# Patient Record
Sex: MALE | Race: WHITE | NOT HISPANIC OR LATINO | URBAN - METROPOLITAN AREA
[De-identification: names, ages, dates, MRNs, and addresses within clinical notes are randomized per-mention and may not be internally consistent; named-entity substitution may affect disease eponyms.]

---

## 2018-11-27 ENCOUNTER — APPOINTMENT (RX ONLY)
Dept: URBAN - METROPOLITAN AREA CLINIC 349 | Facility: CLINIC | Age: 38
Setting detail: DERMATOLOGY
End: 2018-11-27

## 2018-11-27 DIAGNOSIS — L30.9 DERMATITIS, UNSPECIFIED: ICD-10-CM

## 2018-11-27 DIAGNOSIS — L50.1 IDIOPATHIC URTICARIA: ICD-10-CM

## 2018-11-27 PROBLEM — E78.5 HYPERLIPIDEMIA, UNSPECIFIED: Status: ACTIVE | Noted: 2018-11-27

## 2018-11-27 PROBLEM — F41.9 ANXIETY DISORDER, UNSPECIFIED: Status: ACTIVE | Noted: 2018-11-27

## 2018-11-27 PROCEDURE — ? COUNSELING

## 2018-11-27 PROCEDURE — A4550 SURGICAL TRAYS: HCPCS

## 2018-11-27 PROCEDURE — ? BIOPSY BY PUNCH METHOD

## 2018-11-27 PROCEDURE — 99202 OFFICE O/P NEW SF 15 MIN: CPT | Mod: 25

## 2018-11-27 PROCEDURE — 11100: CPT

## 2018-11-27 PROCEDURE — ? PRESCRIPTION

## 2018-11-27 PROCEDURE — ? PATHOLOGY BILLING

## 2018-11-27 PROCEDURE — ? TREATMENT REGIMEN

## 2018-11-27 RX ORDER — TRIAMCINOLONE ACETONIDE 1 MG/G
CREAM TOPICAL
Qty: 1 | Refills: 1 | Status: ERX | COMMUNITY
Start: 2018-11-27

## 2018-11-27 RX ADMIN — TRIAMCINOLONE ACETONIDE: 1 CREAM TOPICAL at 00:00

## 2018-11-27 ASSESSMENT — LOCATION ZONE DERM
LOCATION ZONE: LEG
LOCATION ZONE: ARM
LOCATION ZONE: ARM
LOCATION ZONE: TRUNK
LOCATION ZONE: ARM

## 2018-11-27 ASSESSMENT — LOCATION SIMPLE DESCRIPTION DERM
LOCATION SIMPLE: RIGHT THIGH
LOCATION SIMPLE: LEFT FOREARM
LOCATION SIMPLE: ABDOMEN
LOCATION SIMPLE: LEFT UPPER BACK
LOCATION SIMPLE: LEFT ELBOW
LOCATION SIMPLE: LEFT ELBOW

## 2018-11-27 ASSESSMENT — LOCATION DETAILED DESCRIPTION DERM
LOCATION DETAILED: XIPHOID
LOCATION DETAILED: LEFT SUPERIOR MEDIAL UPPER BACK
LOCATION DETAILED: LEFT ELBOW
LOCATION DETAILED: LEFT ELBOW
LOCATION DETAILED: LEFT PROXIMAL DORSAL FOREARM
LOCATION DETAILED: RIGHT ANTERIOR PROXIMAL THIGH

## 2018-11-27 NOTE — PROCEDURE: TREATMENT REGIMEN
Otc Regimen: All free and Cerave moistuizer daily as discussed
Initiate Treatment: Triamcinolone
Detail Level: Detailed

## 2018-11-27 NOTE — PROCEDURE: PATHOLOGY BILLING
Immunohistochemistry (02082 and 73930) billing is not performed here. Please use the Immunohistochemistry Stain Billing plan to accomplish this. Immunohistochemistry (74504 and 07783) billing is not performed here. Please use the Immunohistochemistry Stain Billing plan to accomplish this.

## 2018-11-27 NOTE — PROCEDURE: BIOPSY BY PUNCH METHOD
Wound Care: Vaseline
Billing Type: Third-Party Bill
Biopsy Type: H and E
Size Of Lesion In Cm (Optional): 0
Bill For Surgical Tray: yes
Post-Care Instructions: I reviewed with the patient in detail post-care instructions. Patient is to keep the biopsy site dry overnight, and then apply bacitracin twice daily until healed. Patient may apply hydrogen peroxide soaks to remove any crusting.\\nAft the procedure, the patient was observed for 5-10 minutes and was oriented to person, place, and time.  Denied feeling dizzy, queasy, and stated that they did not feel that they were going to faint.
Anesthesia Volume In Cc (Will Not Render If 0): 0.5
Suture Removal: 10 days
Epidermal Sutures: 4-0 Polypropylene
Anesthesia Type: 1% lidocaine with epinephrine
Detail Level: Detailed
Punch Size In Mm: 4
Consent: Written consent was obtained and risks were reviewed including but not limited to scarring, infection, bleeding, scabbing, incomplete removal, nerve damage and allergy to anesthesia.
Bill 20754 For Specimen Handling/Conveyance To Laboratory?: no
Hemostasis: None
Accession #: global
Dressing: steri-strips
Notification Instructions: Patient will be notified of biopsy results. However, patient instructed to call the office if not contacted within 2 weeks.

## 2018-12-07 ENCOUNTER — APPOINTMENT (RX ONLY)
Dept: URBAN - METROPOLITAN AREA CLINIC 349 | Facility: CLINIC | Age: 38
Setting detail: DERMATOLOGY
End: 2018-12-07

## 2018-12-07 DIAGNOSIS — L40.0 PSORIASIS VULGARIS: ICD-10-CM

## 2018-12-07 DIAGNOSIS — Z79.899 OTHER LONG TERM (CURRENT) DRUG THERAPY: ICD-10-CM

## 2018-12-07 PROCEDURE — ? SUTURE REMOVAL (GLOBAL PERIOD)

## 2018-12-07 PROCEDURE — ? RECOMMENDATIONS

## 2018-12-07 PROCEDURE — 99213 OFFICE O/P EST LOW 20 MIN: CPT

## 2018-12-07 PROCEDURE — 99024 POSTOP FOLLOW-UP VISIT: CPT

## 2018-12-07 PROCEDURE — ? HIGH RISK MEDICATION MONITORING

## 2018-12-07 PROCEDURE — ? COUNSELING

## 2018-12-07 PROCEDURE — ? ORDER TESTS

## 2018-12-07 ASSESSMENT — LOCATION SIMPLE DESCRIPTION DERM
LOCATION SIMPLE: LEFT ELBOW
LOCATION SIMPLE: RIGHT THIGH

## 2018-12-07 ASSESSMENT — LOCATION ZONE DERM
LOCATION ZONE: LEG
LOCATION ZONE: ARM

## 2018-12-07 ASSESSMENT — LOCATION DETAILED DESCRIPTION DERM
LOCATION DETAILED: LEFT ELBOW
LOCATION DETAILED: RIGHT ANTERIOR PROXIMAL THIGH

## 2018-12-07 NOTE — PROCEDURE: SUTURE REMOVAL (GLOBAL PERIOD)
Detail Level: Detailed
Add 22541 Cpt? (Important Note: In 2017 The Use Of 08587 Is Being Tracked By Cms To Determine Future Global Period Reimbursement For Global Periods): yes

## 2018-12-07 NOTE — PROCEDURE: RECOMMENDATIONS
Recommendations (Free Text): Continue triamcinolone cream twice daily to affected area as needed \\nCerave moisturizer twice a day\\nPatient does have joint pain
Detail Level: Zone

## 2019-05-06 ENCOUNTER — APPOINTMENT (RX ONLY)
Dept: URBAN - METROPOLITAN AREA CLINIC 349 | Facility: CLINIC | Age: 39
Setting detail: DERMATOLOGY
End: 2019-05-06

## 2019-05-06 DIAGNOSIS — L40.0 PSORIASIS VULGARIS: ICD-10-CM | Status: STABLE

## 2019-05-06 DIAGNOSIS — L30.9 DERMATITIS, UNSPECIFIED: ICD-10-CM

## 2019-05-06 PROCEDURE — ? COUNSELING

## 2019-05-06 PROCEDURE — ? PRESCRIPTION

## 2019-05-06 PROCEDURE — ? TREATMENT REGIMEN

## 2019-05-06 PROCEDURE — 99213 OFFICE O/P EST LOW 20 MIN: CPT

## 2019-05-06 RX ORDER — CLINDAMYCIN PHOSPHATE 10 MG/G
GEL TOPICAL
Qty: 1 | Refills: 0 | Status: ERX | COMMUNITY
Start: 2019-05-06

## 2019-05-06 RX ADMIN — CLINDAMYCIN PHOSPHATE: 10 GEL TOPICAL at 00:00

## 2019-05-06 ASSESSMENT — LOCATION DETAILED DESCRIPTION DERM
LOCATION DETAILED: LEFT PROXIMAL POSTERIOR UPPER ARM
LOCATION DETAILED: RIGHT DISTAL POSTERIOR THIGH
LOCATION DETAILED: RIGHT LATERAL UPPER BACK
LOCATION DETAILED: LEFT ANTERIOR PROXIMAL THIGH
LOCATION DETAILED: LEFT ANTERIOR DISTAL THIGH
LOCATION DETAILED: RIGHT DISTAL POSTERIOR THIGH

## 2019-05-06 ASSESSMENT — LOCATION SIMPLE DESCRIPTION DERM
LOCATION SIMPLE: LEFT THIGH
LOCATION SIMPLE: RIGHT UPPER BACK
LOCATION SIMPLE: RIGHT POSTERIOR THIGH
LOCATION SIMPLE: LEFT UPPER ARM
LOCATION SIMPLE: RIGHT POSTERIOR THIGH
LOCATION SIMPLE: LEFT THIGH

## 2019-05-06 ASSESSMENT — LOCATION ZONE DERM
LOCATION ZONE: LEG
LOCATION ZONE: ARM
LOCATION ZONE: LEG
LOCATION ZONE: TRUNK

## 2019-05-06 NOTE — PROCEDURE: TREATMENT REGIMEN
Otc Regimen: Panoxyl wash
Detail Level: Detailed
Initiate Treatment: Panoxyl wash 10%\\nClindamycin gel

## 2019-05-20 RX ORDER — CLINDAMYCIN PHOSPHATE 10 MG/G
GEL TOPICAL
Qty: 1 | Refills: 0 | Status: ERX

## 2019-05-28 ENCOUNTER — APPOINTMENT (RX ONLY)
Dept: URBAN - METROPOLITAN AREA CLINIC 349 | Facility: CLINIC | Age: 39
Setting detail: DERMATOLOGY
End: 2019-05-28

## 2019-05-28 DIAGNOSIS — L30.9 DERMATITIS, UNSPECIFIED: ICD-10-CM | Status: IMPROVED

## 2019-05-28 DIAGNOSIS — L40.0 PSORIASIS VULGARIS: ICD-10-CM | Status: STABLE

## 2019-05-28 PROCEDURE — ? PRESCRIPTION

## 2019-05-28 PROCEDURE — ? RECOMMENDATIONS

## 2019-05-28 PROCEDURE — 99213 OFFICE O/P EST LOW 20 MIN: CPT

## 2019-05-28 PROCEDURE — ? TREATMENT REGIMEN

## 2019-05-28 PROCEDURE — ? COUNSELING

## 2019-05-28 RX ORDER — CLINDAMYCIN PHOSPHATE 10 MG/G
GEL TOPICAL
Qty: 1 | Refills: 4 | Status: ERX

## 2019-05-28 ASSESSMENT — LOCATION DETAILED DESCRIPTION DERM
LOCATION DETAILED: RIGHT DISTAL POSTERIOR THIGH
LOCATION DETAILED: RIGHT LATERAL UPPER BACK
LOCATION DETAILED: LEFT ANTERIOR PROXIMAL THIGH
LOCATION DETAILED: LEFT ANTERIOR DISTAL THIGH
LOCATION DETAILED: RIGHT DISTAL POSTERIOR THIGH
LOCATION DETAILED: LEFT PROXIMAL POSTERIOR UPPER ARM

## 2019-05-28 ASSESSMENT — LOCATION ZONE DERM
LOCATION ZONE: ARM
LOCATION ZONE: LEG
LOCATION ZONE: TRUNK
LOCATION ZONE: LEG

## 2019-05-28 ASSESSMENT — LOCATION SIMPLE DESCRIPTION DERM
LOCATION SIMPLE: LEFT THIGH
LOCATION SIMPLE: LEFT UPPER ARM
LOCATION SIMPLE: RIGHT POSTERIOR THIGH
LOCATION SIMPLE: LEFT THIGH
LOCATION SIMPLE: RIGHT UPPER BACK
LOCATION SIMPLE: RIGHT POSTERIOR THIGH

## 2019-05-28 NOTE — PROCEDURE: TREATMENT REGIMEN
Continue Regimen: Panoxyl wash 10%\\nClindamycin gel
Otc Regimen: Panoxyl wash
Detail Level: Detailed

## 2019-05-28 NOTE — PROCEDURE: RECOMMENDATIONS
Detail Level: Zone
Recommendations (Free Text): Patient given [facility] # to call and follow up, messages were left and unable to reach patient

## 2019-09-10 ENCOUNTER — APPOINTMENT (RX ONLY)
Dept: URBAN - METROPOLITAN AREA CLINIC 349 | Facility: CLINIC | Age: 39
Setting detail: DERMATOLOGY
End: 2019-09-10

## 2019-09-10 DIAGNOSIS — L40.0 PSORIASIS VULGARIS: ICD-10-CM

## 2019-09-10 PROCEDURE — ? INJECTION

## 2019-09-10 PROCEDURE — 96372 THER/PROPH/DIAG INJ SC/IM: CPT

## 2019-09-10 ASSESSMENT — LOCATION DETAILED DESCRIPTION DERM: LOCATION DETAILED: LEFT ANTERIOR PROXIMAL THIGH

## 2019-09-10 ASSESSMENT — LOCATION SIMPLE DESCRIPTION DERM: LOCATION SIMPLE: LEFT THIGH

## 2019-09-10 ASSESSMENT — LOCATION ZONE DERM: LOCATION ZONE: LEG

## 2021-01-05 ENCOUNTER — ANESTHESIA EVENT (OUTPATIENT)
Dept: SURGERY | Age: 41
End: 2021-01-05
Payer: COMMERCIAL

## 2021-01-06 ENCOUNTER — ANESTHESIA (OUTPATIENT)
Dept: SURGERY | Age: 41
End: 2021-01-06
Payer: COMMERCIAL

## 2021-01-06 ENCOUNTER — HOSPITAL ENCOUNTER (OUTPATIENT)
Age: 41
Setting detail: OUTPATIENT SURGERY
Discharge: HOME | End: 2021-01-06
Attending: ORTHOPAEDIC SURGERY | Admitting: ORTHOPAEDIC SURGERY
Payer: COMMERCIAL

## 2021-01-06 VITALS
OXYGEN SATURATION: 95 % | RESPIRATION RATE: 14 BRPM | SYSTOLIC BLOOD PRESSURE: 133 MMHG | HEART RATE: 71 BPM | BODY MASS INDEX: 30.67 KG/M2 | WEIGHT: 190 LBS | DIASTOLIC BLOOD PRESSURE: 83 MMHG | TEMPERATURE: 97.3 F

## 2021-01-06 PROCEDURE — 77030002934 HC SUT MCRYL J&J -B: Performed by: ORTHOPAEDIC SURGERY

## 2021-01-06 PROCEDURE — 76010010054 HC POST OP PAIN BLOCK: Performed by: ORTHOPAEDIC SURGERY

## 2021-01-06 PROCEDURE — 77030003666 HC NDL SPINAL BD -A: Performed by: ORTHOPAEDIC SURGERY

## 2021-01-06 PROCEDURE — 77030010509 HC AIRWY LMA MSK TELE -A: Performed by: ANESTHESIOLOGY

## 2021-01-06 PROCEDURE — 74011250636 HC RX REV CODE- 250/636: Performed by: ANESTHESIOLOGY

## 2021-01-06 PROCEDURE — 74011250636 HC RX REV CODE- 250/636: Performed by: NURSE ANESTHETIST, CERTIFIED REGISTERED

## 2021-01-06 PROCEDURE — C1713 ANCHOR/SCREW BN/BN,TIS/BN: HCPCS | Performed by: ORTHOPAEDIC SURGERY

## 2021-01-06 PROCEDURE — 77030006891 HC BLD SHV RESECT STRY -B: Performed by: ORTHOPAEDIC SURGERY

## 2021-01-06 PROCEDURE — 74011000250 HC RX REV CODE- 250: Performed by: NURSE ANESTHETIST, CERTIFIED REGISTERED

## 2021-01-06 PROCEDURE — 76210000063 HC OR PH I REC FIRST 0.5 HR: Performed by: ORTHOPAEDIC SURGERY

## 2021-01-06 PROCEDURE — 77030033005 HC TBNG ARTHSC PMP STRY -B: Performed by: ORTHOPAEDIC SURGERY

## 2021-01-06 PROCEDURE — 77030006788 HC BLD SAW OSC STRY -B: Performed by: ORTHOPAEDIC SURGERY

## 2021-01-06 PROCEDURE — 77030002966 HC SUT PDS J&J -A: Performed by: ORTHOPAEDIC SURGERY

## 2021-01-06 PROCEDURE — 76210000020 HC REC RM PH II FIRST 0.5 HR: Performed by: ORTHOPAEDIC SURGERY

## 2021-01-06 PROCEDURE — 74011250637 HC RX REV CODE- 250/637: Performed by: ANESTHESIOLOGY

## 2021-01-06 PROCEDURE — 77030002933 HC SUT MCRYL J&J -A: Performed by: ORTHOPAEDIC SURGERY

## 2021-01-06 PROCEDURE — 77030000032 HC CUF TRNQT ZIMM -B: Performed by: ORTHOPAEDIC SURGERY

## 2021-01-06 PROCEDURE — 76010010054 HC POST OP PAIN BLOCK

## 2021-01-06 PROCEDURE — 77030006590 HC BLD ARTHSC GRFT J&J -C: Performed by: ORTHOPAEDIC SURGERY

## 2021-01-06 PROCEDURE — 77030018986 HC SUT ETHBND4 J&J -B: Performed by: ORTHOPAEDIC SURGERY

## 2021-01-06 PROCEDURE — 77030007604: Performed by: ORTHOPAEDIC SURGERY

## 2021-01-06 PROCEDURE — 77030020275 HC MISC ORTHOPEDIC: Performed by: ORTHOPAEDIC SURGERY

## 2021-01-06 PROCEDURE — 76942 ECHO GUIDE FOR BIOPSY: CPT | Performed by: ORTHOPAEDIC SURGERY

## 2021-01-06 PROCEDURE — 76060000035 HC ANESTHESIA 2 TO 2.5 HR: Performed by: ORTHOPAEDIC SURGERY

## 2021-01-06 PROCEDURE — 76010000171 HC OR TIME 2 TO 2.5 HR INTENSV-TIER 1: Performed by: ORTHOPAEDIC SURGERY

## 2021-01-06 PROCEDURE — 77030040936 HC WND COBLATN S&N -C: Performed by: ORTHOPAEDIC SURGERY

## 2021-01-06 PROCEDURE — 2709999900 HC NON-CHARGEABLE SUPPLY: Performed by: ORTHOPAEDIC SURGERY

## 2021-01-06 PROCEDURE — 77030003602 HC NDL NRV BLK BBMI -B: Performed by: ANESTHESIOLOGY

## 2021-01-06 DEVICE — BIOSURE REGENSORB INTERFERENCE                                    SCREW 7 MM X 25MM
Type: IMPLANTABLE DEVICE | Site: KNEE | Status: FUNCTIONAL
Brand: BIOSURE

## 2021-01-06 RX ORDER — MIDAZOLAM HYDROCHLORIDE 1 MG/ML
2 INJECTION, SOLUTION INTRAMUSCULAR; INTRAVENOUS ONCE
Status: COMPLETED | OUTPATIENT
Start: 2021-01-06 | End: 2021-01-06

## 2021-01-06 RX ORDER — HYDROMORPHONE HYDROCHLORIDE 2 MG/ML
0.5 INJECTION, SOLUTION INTRAMUSCULAR; INTRAVENOUS; SUBCUTANEOUS
Status: DISCONTINUED | OUTPATIENT
Start: 2021-01-06 | End: 2021-01-06 | Stop reason: HOSPADM

## 2021-01-06 RX ORDER — SODIUM CHLORIDE 0.9 % (FLUSH) 0.9 %
5-40 SYRINGE (ML) INJECTION EVERY 8 HOURS
Status: DISCONTINUED | OUTPATIENT
Start: 2021-01-06 | End: 2021-01-06 | Stop reason: HOSPADM

## 2021-01-06 RX ORDER — MIDAZOLAM HYDROCHLORIDE 1 MG/ML
2 INJECTION, SOLUTION INTRAMUSCULAR; INTRAVENOUS
Status: DISCONTINUED | OUTPATIENT
Start: 2021-01-06 | End: 2021-01-06 | Stop reason: HOSPADM

## 2021-01-06 RX ORDER — SODIUM CHLORIDE, SODIUM LACTATE, POTASSIUM CHLORIDE, CALCIUM CHLORIDE 600; 310; 30; 20 MG/100ML; MG/100ML; MG/100ML; MG/100ML
100 INJECTION, SOLUTION INTRAVENOUS CONTINUOUS
Status: DISCONTINUED | OUTPATIENT
Start: 2021-01-06 | End: 2021-01-06 | Stop reason: HOSPADM

## 2021-01-06 RX ORDER — DEXAMETHASONE SODIUM PHOSPHATE 4 MG/ML
INJECTION, SOLUTION INTRA-ARTICULAR; INTRALESIONAL; INTRAMUSCULAR; INTRAVENOUS; SOFT TISSUE AS NEEDED
Status: DISCONTINUED | OUTPATIENT
Start: 2021-01-06 | End: 2021-01-06 | Stop reason: HOSPADM

## 2021-01-06 RX ORDER — ROPIVACAINE HYDROCHLORIDE 5 MG/ML
INJECTION, SOLUTION EPIDURAL; INFILTRATION; PERINEURAL AS NEEDED
Status: DISCONTINUED | OUTPATIENT
Start: 2021-01-06 | End: 2021-01-06 | Stop reason: HOSPADM

## 2021-01-06 RX ORDER — CEFAZOLIN SODIUM/WATER 2 G/20 ML
2 SYRINGE (ML) INTRAVENOUS ONCE
Status: DISCONTINUED | OUTPATIENT
Start: 2021-01-06 | End: 2021-01-06 | Stop reason: HOSPADM

## 2021-01-06 RX ORDER — ONDANSETRON 2 MG/ML
INJECTION INTRAMUSCULAR; INTRAVENOUS AS NEEDED
Status: DISCONTINUED | OUTPATIENT
Start: 2021-01-06 | End: 2021-01-06 | Stop reason: HOSPADM

## 2021-01-06 RX ORDER — FENTANYL CITRATE 50 UG/ML
INJECTION, SOLUTION INTRAMUSCULAR; INTRAVENOUS AS NEEDED
Status: DISCONTINUED | OUTPATIENT
Start: 2021-01-06 | End: 2021-01-06 | Stop reason: HOSPADM

## 2021-01-06 RX ORDER — OXYCODONE HYDROCHLORIDE 5 MG/1
10 TABLET ORAL
Status: COMPLETED | OUTPATIENT
Start: 2021-01-06 | End: 2021-01-06

## 2021-01-06 RX ORDER — LIDOCAINE HYDROCHLORIDE 20 MG/ML
INJECTION, SOLUTION EPIDURAL; INFILTRATION; INTRACAUDAL; PERINEURAL AS NEEDED
Status: DISCONTINUED | OUTPATIENT
Start: 2021-01-06 | End: 2021-01-06 | Stop reason: HOSPADM

## 2021-01-06 RX ORDER — SODIUM CHLORIDE 0.9 % (FLUSH) 0.9 %
5-40 SYRINGE (ML) INJECTION AS NEEDED
Status: DISCONTINUED | OUTPATIENT
Start: 2021-01-06 | End: 2021-01-06 | Stop reason: HOSPADM

## 2021-01-06 RX ORDER — FLUMAZENIL 0.1 MG/ML
0.2 INJECTION INTRAVENOUS
Status: DISCONTINUED | OUTPATIENT
Start: 2021-01-06 | End: 2021-01-06 | Stop reason: HOSPADM

## 2021-01-06 RX ORDER — KETOROLAC TROMETHAMINE 30 MG/ML
INJECTION, SOLUTION INTRAMUSCULAR; INTRAVENOUS AS NEEDED
Status: DISCONTINUED | OUTPATIENT
Start: 2021-01-06 | End: 2021-01-06 | Stop reason: HOSPADM

## 2021-01-06 RX ORDER — LIDOCAINE HYDROCHLORIDE 10 MG/ML
0.1 INJECTION INFILTRATION; PERINEURAL AS NEEDED
Status: DISCONTINUED | OUTPATIENT
Start: 2021-01-06 | End: 2021-01-06 | Stop reason: HOSPADM

## 2021-01-06 RX ORDER — ACETAMINOPHEN 500 MG
1000 TABLET ORAL ONCE
Status: COMPLETED | OUTPATIENT
Start: 2021-01-06 | End: 2021-01-06

## 2021-01-06 RX ORDER — OXYCODONE HYDROCHLORIDE 5 MG/1
5 TABLET ORAL
Status: DISCONTINUED | OUTPATIENT
Start: 2021-01-06 | End: 2021-01-06 | Stop reason: HOSPADM

## 2021-01-06 RX ORDER — NALOXONE HYDROCHLORIDE 0.4 MG/ML
0.2 INJECTION, SOLUTION INTRAMUSCULAR; INTRAVENOUS; SUBCUTANEOUS AS NEEDED
Status: DISCONTINUED | OUTPATIENT
Start: 2021-01-06 | End: 2021-01-06 | Stop reason: HOSPADM

## 2021-01-06 RX ORDER — FENTANYL CITRATE 50 UG/ML
100 INJECTION, SOLUTION INTRAMUSCULAR; INTRAVENOUS ONCE
Status: COMPLETED | OUTPATIENT
Start: 2021-01-06 | End: 2021-01-06

## 2021-01-06 RX ORDER — PROPOFOL 10 MG/ML
INJECTION, EMULSION INTRAVENOUS AS NEEDED
Status: DISCONTINUED | OUTPATIENT
Start: 2021-01-06 | End: 2021-01-06 | Stop reason: HOSPADM

## 2021-01-06 RX ORDER — DIPHENHYDRAMINE HYDROCHLORIDE 50 MG/ML
12.5 INJECTION, SOLUTION INTRAMUSCULAR; INTRAVENOUS
Status: DISCONTINUED | OUTPATIENT
Start: 2021-01-06 | End: 2021-01-06 | Stop reason: HOSPADM

## 2021-01-06 RX ADMIN — DEXAMETHASONE SODIUM PHOSPHATE 4 MG: 4 INJECTION, SOLUTION INTRAMUSCULAR; INTRAVENOUS at 07:03

## 2021-01-06 RX ADMIN — FENTANYL CITRATE 50 MCG: 50 INJECTION INTRAMUSCULAR; INTRAVENOUS at 08:40

## 2021-01-06 RX ADMIN — ONDANSETRON 4 MG: 2 INJECTION INTRAMUSCULAR; INTRAVENOUS at 08:08

## 2021-01-06 RX ADMIN — PROPOFOL 200 MG: 10 INJECTION, EMULSION INTRAVENOUS at 07:58

## 2021-01-06 RX ADMIN — ACETAMINOPHEN 1000 MG: 500 TABLET ORAL at 10:24

## 2021-01-06 RX ADMIN — DEXAMETHASONE SODIUM PHOSPHATE 2 MG: 4 INJECTION, SOLUTION INTRAMUSCULAR; INTRAVENOUS at 07:06

## 2021-01-06 RX ADMIN — KETOROLAC TROMETHAMINE 30 MG: 30 INJECTION, SOLUTION INTRAMUSCULAR at 09:52

## 2021-01-06 RX ADMIN — DEXAMETHASONE SODIUM PHOSPHATE 4 MG: 4 INJECTION, SOLUTION INTRAMUSCULAR; INTRAVENOUS at 08:08

## 2021-01-06 RX ADMIN — LIDOCAINE HYDROCHLORIDE 80 MG: 20 INJECTION, SOLUTION EPIDURAL; INFILTRATION; INTRACAUDAL; PERINEURAL at 07:58

## 2021-01-06 RX ADMIN — OXYCODONE HYDROCHLORIDE 10 MG: 5 TABLET ORAL at 10:24

## 2021-01-06 RX ADMIN — SODIUM CHLORIDE, SODIUM LACTATE, POTASSIUM CHLORIDE, AND CALCIUM CHLORIDE 100 ML/HR: 600; 310; 30; 20 INJECTION, SOLUTION INTRAVENOUS at 06:45

## 2021-01-06 RX ADMIN — FENTANYL CITRATE 50 MCG: 50 INJECTION INTRAMUSCULAR; INTRAVENOUS at 08:12

## 2021-01-06 RX ADMIN — ROPIVACAINE HYDROCHLORIDE 20 ML: 150 INJECTION, SOLUTION EPIDURAL; INFILTRATION; PERINEURAL at 07:06

## 2021-01-06 RX ADMIN — MIDAZOLAM 2 MG: 1 INJECTION INTRAMUSCULAR; INTRAVENOUS at 06:58

## 2021-01-06 RX ADMIN — FENTANYL CITRATE 100 MCG: 0.05 INJECTION, SOLUTION INTRAMUSCULAR; INTRAVENOUS at 06:58

## 2021-01-06 RX ADMIN — ROPIVACAINE HYDROCHLORIDE 30 ML: 150 INJECTION, SOLUTION EPIDURAL; INFILTRATION; PERINEURAL at 07:03

## 2021-01-06 NOTE — DISCHARGE INSTRUCTIONS
Post-Operative Instructions   For  Anterior Cruciate Ligament Reconstruction  Phone:  [phone]    1. Touch down weight bearing for 6 weeks on the operated leg. Use crutches to help with ambulation. 2.  If you do not have an \"Iceman\" type cooling unit, for the first 48-72 hours following surgery, use ice on the knee every two hours (while awake) for 20-30 minutes at a time to help prevent swelling and lessen pain. If you have a cooling unit, follow the instructions given to you- continually as much as possible the first 48-72 hours, then 3-4 times a day for 4 weeks. Elevate leg. 3. You have been given a hinged brace. Keep the brace locked in a straight position at all times until you begin therapy. 4. You may remove the brace to shower and wrap the dressings to keep them dry. 5. Use any pain medication as instructed. You should take your pain medication as soon as you feel the anesthetic wearing off. Do not wait until you are in severe pain to begin taking your pain medication. 6. You may have some side effects from your pain medication. If you have nausea, try taking your medication with food. For itching, you may take over the counter Benadryl. 7. Begin therapy as ordered    8. You may have been given a prescription for Zofran or Phenergan. This medication is used for nausea and vomiting. You do not need to get this prescription filled unless you have a problem. 9. If you have a problem, please call [address] [place] [address] at [phone]    Paz Goodwin MD    [facility] ACTIVITY  · As tolerated and as directed by your doctor. · Bathe or shower as directed by your doctor. DIET  · Clear liquids until no nausea or vomiting; then light diet for the first day. · Advance to regular diet on second day, unless your doctor orders otherwise. · If nausea and vomiting continues, call your doctor.            CALL YOUR DOCTOR IF YOU Have pain unrelieved by your pain medication. · Excessive bleeding that does not stop after holding pressure over the area  · Temperature of 101 degrees F or above  · Excessive redness, swelling or bruising, and/ or green or yellow, smelly discharge from incision        After general anesthesia or intravenous sedation, for 24 hours or while taking prescription Narcotics:  · Limit your activities  · Do not drive and operate hazardous machinery  · Do not make important personal or business decisions  · Do  not drink alcoholic beverages  · If you have not urinated within 8 hours after discharge, please contact your surgeon on call. *  Please give a list of your current medications to your Primary Care Provider. *  Please update this list whenever your medications are discontinued, doses are      changed, or new medications (including over-the-counter products) are added. *  Please carry medication information at all times in case of emergency situations. These are general instructions for a healthy lifestyle:    No smoking/ No tobacco products/ Avoid exposure to second hand smoke    Surgeon General's Warning:  Quitting smoking now greatly reduces serious risk to your health. Obesity, smoking, and sedentary lifestyle greatly increases your risk for illness    A healthy diet, regular physical exercise & weight monitoring are important for maintaining a healthy lifestyle    You may be retaining fluid if you have a history of heart failure or if you experience any of the following symptoms:  Weight gain of 3 pounds or more overnight or 5 pounds in a week, increased swelling in our hands or feet or shortness of breath while lying flat in bed. Please call your doctor as soon as you notice any of these symptoms; do not wait until your next office visit.     Recognize signs and symptoms of STROKE:    F-face looks uneven    A-arms unable to move or move unevenly    S-speech slurred or non-existent    T-time-call 911 as soon as signs and symptoms begin-DO NOT go       Back to bed or wait to see if you get better-TIME IS BRAIN.

## 2021-01-06 NOTE — ANESTHESIA PROCEDURE NOTES
Peripheral Block    Start time: 1/6/2021 7:05 AM  End time: 1/6/2021 7:06 AM  Performed by: Cali Jane MD  Authorized by: Cali Jane MD       Pre-procedure: Indications: at surgeon's request and post-op pain management    Preanesthetic Checklist: patient identified, risks and benefits discussed, site marked, timeout performed, anesthesia consent given and patient being monitored    Timeout Time: 07:04          Block Type:   Block Type:   Adductor canal  Laterality:  Left  Monitoring:  Continuous pulse ox, frequent vital sign checks, heart rate, responsive to questions and oxygen  Injection Technique:  Single shot  Procedures: ultrasound guided    Prep: chlorhexidine    Location:  Mid thigh  Needle Type:  Stimuplex  Needle Gauge:  20 G  Needle Localization:  Anatomical landmarks, infiltration and ultrasound guidance    Assessment:  Number of attempts:  1  Injection Assessment:  Incremental injection every 5 mL, negative aspiration for CSF, local visualized surrounding nerve on ultrasound, negative aspiration for blood, no intravascular symptoms, no paresthesia and ultrasound image on chart  Patient tolerance:  Patient tolerated the procedure well with no immediate complications

## 2021-01-06 NOTE — PERIOP NOTES
PACU DISCHARGE NOTE    Vital signs stable, pain well controlled, alert and oriented times three or at baseline, follow up per surgeon, no anesthetic complications. Discharge instructions and prescritption for Oxycodone given to pt and his wife, Rolf Mitchell. Pt is tolerating PO and has had his IV removed intact. Pt to discharge door via wheelchair and left in care of his wife.

## 2021-01-06 NOTE — ANESTHESIA POSTPROCEDURE EVALUATION
Procedure(s):  LEFT KNEE ARTHROSCOPY WITH ANTERIOR CRUCIATE LIGAMENT RECONSTRUCTION W/ BTB AUTOGRAFT AND OATS.     general    Anesthesia Post Evaluation      Multimodal analgesia: multimodal analgesia used between 6 hours prior to anesthesia start to PACU discharge  Patient location during evaluation: PACU  Patient participation: complete - patient participated  Level of consciousness: awake and alert  Pain management: adequate  Airway patency: patent  Anesthetic complications: no  Cardiovascular status: acceptable and hemodynamically stable  Respiratory status: acceptable  Hydration status: acceptable  Post anesthesia nausea and vomiting:  none  Final Post Anesthesia Temperature Assessment:  Normothermia (36.0-37.5 degrees C)      INITIAL Post-op Vital signs:   Vitals Value Taken Time   /79 01/06/21 1028   Temp 36.3 °C (97.3 °F) 01/06/21 1028   Pulse 74 01/06/21 1028   Resp 12 01/06/21 1028   SpO2 95 % 01/06/21 1028

## 2021-01-06 NOTE — ANESTHESIA PROCEDURE NOTES
Peripheral Block    Start time: 1/6/2021 6:59 AM  End time: 1/6/2021 7:03 AM  Performed by: Adina Mcardle, MD  Authorized by: Adina Mcardle, MD       Pre-procedure:    Indications: at surgeon's request and post-op pain management    Preanesthetic Checklist: patient identified, risks and benefits discussed, site marked, timeout performed, anesthesia consent given and patient being monitored    Timeout Time: 06:58          Block Type:   Block Type:  Sciatic single shot  Laterality:  Left  Monitoring:  Continuous pulse ox, frequent vital sign checks, heart rate, responsive to questions and oxygen  Injection Technique:  Single shot  Procedures: ultrasound guided    Patient Position: supine  Prep: chlorhexidine    Location:  Mid thigh  Needle Type:  Stimuplex  Needle Gauge:  20 G  Needle Localization:  Anatomical landmarks, infiltration and ultrasound guidance    Assessment:  Number of attempts:  1  Injection Assessment:  Incremental injection every 5 mL, negative aspiration for CSF, local visualized surrounding nerve on ultrasound, negative aspiration for blood, no intravascular symptoms, no paresthesia and ultrasound image on chart  Patient tolerance:  Patient tolerated the procedure well with no immediate complications

## 2021-01-06 NOTE — ANESTHESIA PREPROCEDURE EVALUATION
Relevant Problems   GASTROINTESTINAL   (+) GERD (gastroesophageal reflux disease)   (+) PUD (peptic ulcer disease)       Anesthetic History   No history of anesthetic complications            Review of Systems / Medical History  Patient summary reviewed and pertinent labs reviewed    Pulmonary        Sleep apnea: CPAP           Neuro/Psych         Psychiatric history     Cardiovascular                  Exercise tolerance: >4 METS     GI/Hepatic/Renal     GERD      PUD     Endo/Other        Obesity     Other Findings            Physical Exam    Airway  Mallampati: I  TM Distance: 4 - 6 cm  Neck ROM: normal range of motion   Mouth opening: Normal     Cardiovascular    Rhythm: regular  Rate: normal         Dental         Pulmonary  Breath sounds clear to auscultation               Abdominal         Other Findings            Anesthetic Plan    ASA: 2  Anesthesia type: general      Post-op pain plan if not by surgeon: peripheral nerve block single    Induction: Intravenous  Anesthetic plan and risks discussed with: Patient and Spouse

## 2021-01-06 NOTE — H&P
Outpatient Surgery History and Physical:  Daniel Madden was seen and examined. CHIEF COMPLAINT:   Left knee. PE:     Visit Vitals  /70   Pulse 61   Temp 97.9 °F (36.6 °C)   Resp 13   Wt 86.2 kg (190 lb)   SpO2 98%   BMI 30.67 kg/m²       Heart:   Regular rhythm      Lungs:  Are clear      Past Medical History:    Patient Active Problem List    Diagnosis    Tobacco dependence    Dyslipidemia    GERD (gastroesophageal reflux disease)    PUD (peptic ulcer disease)    Anxiety       Surgical History:   Past Surgical History:   Procedure Laterality Date    HX ENDOSCOPY  2007    HX HERNIA REPAIR      HX VASECTOMY         Social History: Patient  reports that he has been smoking. He has a 20.00 pack-year smoking history. He has never used smokeless tobacco. He reports that he does not drink alcohol or use drugs. Family History:   Family History   Problem Relation Age of Onset    High Cholesterol Mother     Heart Failure Mother     No Known Problems Father     No Known Problems Brother        Allergies: Reviewed per EMR  No Known Allergies    Medications:    No current facility-administered medications on file prior to encounter. Current Outpatient Medications on File Prior to Encounter   Medication Sig    simvastatin (ZOCOR) 40 mg tablet Take 1 Tab by mouth daily. (Patient taking differently: Take 40 mg by mouth nightly.)    citalopram (CELEXA) 40 mg tablet Take 1 Tab by mouth daily.  clobetasol (TEMOVATE) 0.05 % topical cream Apply to feet BID prn.  ALPRAZolam (XANAX) 0.25 mg tablet Take 1 Tab by mouth daily as needed for Anxiety. Indications: anxiety       The surgery is planned for the left knee. History and physical has been reviewed. The patient has been examined.  There have been no significant clinical changes since the completion of the originally dated History and Physical.  Patient identified by surgeon; surgical site was confirmed by patient and surgeon. The patient is here today for outpatient surgery. I have examined the patient, no changes are noted in the patient's medical status. Necessity for the procedure/care is still present and the history and physical above is current. See the office notes for the full long term history of the problem. Please see the recent office notes for the musculoskeletal examination.     Signed By: Harvey Taylor MD     January 6, 2021 7:25 AM

## 2021-01-07 NOTE — OP NOTES
[facility]  OPERATIVE REPORT    Name:  Betsy Wood  MR#:  [id number]  :  1980  ACCOUNT #:  [de-identified]  DATE OF SERVICE:  2021    PREOPERATIVE DIAGNOSES:  1. Anterior cruciate ligament tear. 2.  Chondromalacia of femoral condyle. 3.  Medial meniscal tear, left knee. POSTOPERATIVE DIAGNOSES:  1. Anterior cruciate ligament tear. 2.  Chondromalacia of femoral condyle. 3.  Medial meniscal tear, left knee. PROCEDURE PERFORMED:  1. Anterior cruciate ligament reconstruction using bone-tendon-bone autograft - CPT 05413.  2.  Arthroscopic mosaicplasty, medial femoral condyle - CPT 40861.  3.  Partial medial meniscectomy - CPT 15232, left knee. SURGEON:  Katherine Agustin MD    ASSISTANT:  None. ANESTHESIA:  General.    FLUIDS:  Crystalloid. COMPLICATIONS:  None. SPECIMENS REMOVED:  None. IMPLANTS:  Yes, see record. ESTIMATED BLOOD LOSS:  Minimal.    FINDINGS:  Exam under anesthesia revealed a 2+ Lachman's with a soft endpoint, 2+ pivot, full range of motion, no other instability. Intraoperative findings revealed tearing of the posterior horn and body of the medial meniscus in the white/white zone with a horizontal cleavage tear. There was an area of chondral delamination of the medial femoral condyle that was unstable and approximately 10 x 15 mm. There was a complete tear of the anterior cruciate ligament. PROCEDURE:  After informed consent, the patient was brought to the operating room and placed on the room table in the supine position. General anesthesia was administered without difficulty. Exam under anesthesia was performed with the aforementioned findings. Tourniquet was applied to the left upper thigh. Left knee and leg were prepped and draped in sterile fashion. Tourniquet was inflated. An anterior incision was made and carried down to subcutaneous tissue. The paratenon and patellar tendon were dissected out in separate layers. A central peripatellar tendon autograft was taken 10 mm in width with a 15-mm patellar bone plug and 20-mm tibial bone plug. The patellar bone bed was grafted. The paratenon and patellar tendon were closed in separate layers with absorbable sutures. The graft was prepared in the usual fashion on the back table. Standard inferomedial and inferolateral portals were made for scope and instruments. The knee was then arthroscoped in sequential manner. The aforementioned findings were noted. A partial medial meniscectomy was performed. All of the torn portion was removed. At the termination of the partial medial meniscectomy, the remaining meniscal rim had a smooth contour. There were no sharp edges or unstable fragments. Attention was directed to the medial femoral condyle. The area of delamination was as noted. It was debrided back to a smooth stable area. The surrounding cartilage bordering the lesion was stable with no sharp edges. The Arthrex mosaicplasty system was used to obtain grafts of 10 mm and 6 mm in the intercondylar notch area in the region of the notchplasty. These were prepared in usual manner and then grafted into the medial femoral condyle arthroscopically. Excellent fill of the defects were noted with the osteochondral autografts. The arthroscopic mosaicplasty was performed without difficulty. Attention was directed to the intercondylar notch. The ACL stump was debrided. A notchplasty was performed including the area where the mosaicplasty grafts were obtained. This was done all the way to the posterior periosteal fringe at the over-the-top position. A reference point was made just anterior to this at the 2 o'clock position for future femoral tunnel placement. A guidepin was placed from the proximal medial tibia up to the center of the old ACL footprint in line with the leading edge of the lateral meniscus just anterior to the medial tibial spine. This was over reamed.   A femoral tunnel was made at the aforementioned reference point to a depth of 25 mm with a reamer. At the termination of the femoral tunnel preparation, there was a 1-2-mm posterior cortical rim with circumferential bone noted within the tunnel. The graft was passed up through the knee and affixed on the femoral side with a 7 x 25-mm bioabsorbable interference screw. This had excellent purchase. TUG testing revealed stable fixation at this interface. The graft was then tensioned to the tibial bone plug sutures and a posterior drawer was applied with the knee in 20 degrees of flexion and it was affixed on the tibial side with a 6.5 x 30-mm cancellous screw and washer. The graft was then viewed arthroscopically. It had excellent tension and anatomic position. It did not impinge through full range of motion. Clinically, the patient had full motion. He had a negative Lachman's with a firm endpoint and negative pivot. The knee was irrigated. Wounds were closed in layers. Sterile dressings and a brace were applied. The patient was taken to the recovery room in stable condition.       MD GAMA [id number]  D:  01/07/2021 12:56  T:  01/07/2021 13:28  JOB #:  [id number]

## 2022-06-30 ENCOUNTER — OFFICE VISIT (OUTPATIENT)
Dept: ENT CLINIC | Age: 42
End: 2022-06-30
Payer: COMMERCIAL

## 2022-06-30 VITALS — WEIGHT: 205 LBS | BODY MASS INDEX: 32.95 KG/M2 | HEIGHT: 66 IN

## 2022-06-30 DIAGNOSIS — J34.89 NASAL OBSTRUCTION: ICD-10-CM

## 2022-06-30 DIAGNOSIS — H72.91 PERFORATION OF RIGHT TYMPANIC MEMBRANE: ICD-10-CM

## 2022-06-30 DIAGNOSIS — J34.2 NASAL SEPTAL DEVIATION: ICD-10-CM

## 2022-06-30 DIAGNOSIS — J34.89 CONCHA BULLOSA: ICD-10-CM

## 2022-06-30 DIAGNOSIS — H90.42 SENSORINEURAL HEARING LOSS (SNHL) OF LEFT EAR WITH UNRESTRICTED HEARING OF RIGHT EAR: Primary | ICD-10-CM

## 2022-06-30 DIAGNOSIS — J34.89 NASAL VALVE COLLAPSE: ICD-10-CM

## 2022-06-30 DIAGNOSIS — J34.3 HYPERTROPHY OF BOTH INFERIOR NASAL TURBINATES: ICD-10-CM

## 2022-06-30 DIAGNOSIS — J30.9 ALLERGIC RHINITIS, UNSPECIFIED SEASONALITY, UNSPECIFIED TRIGGER: ICD-10-CM

## 2022-06-30 PROCEDURE — 99204 OFFICE O/P NEW MOD 45 MIN: CPT | Performed by: STUDENT IN AN ORGANIZED HEALTH CARE EDUCATION/TRAINING PROGRAM

## 2022-06-30 PROCEDURE — 92557 COMPREHENSIVE HEARING TEST: CPT | Performed by: AUDIOLOGIST

## 2022-06-30 RX ORDER — CETIRIZINE HYDROCHLORIDE 10 MG/1
10 TABLET ORAL DAILY
COMMUNITY

## 2022-06-30 RX ORDER — PANTOPRAZOLE SODIUM 40 MG/1
TABLET, DELAYED RELEASE ORAL
COMMUNITY
Start: 2022-06-13

## 2022-06-30 RX ORDER — FLUTICASONE PROPIONATE 50 MCG
SPRAY, SUSPENSION (ML) NASAL
COMMUNITY
Start: 2022-06-13

## 2022-06-30 RX ORDER — RISANKIZUMAB-RZAA 150 MG/ML
INJECTION SUBCUTANEOUS
COMMUNITY
Start: 2022-06-22

## 2022-06-30 ASSESSMENT — ENCOUNTER SYMPTOMS
APNEA: 0
EYE DISCHARGE: 0
COLOR CHANGE: 0
ABDOMINAL DISTENTION: 0

## 2022-06-30 NOTE — PROGRESS NOTES
AUDIOLOGY EVALUATION    Angeline Reasons had Audiometry performed today. The patient reports tinnitus. Results as follows: Audiometry    Test Performed - Comprehensive Audiogram    Type of Loss - Right Ear: normal hearing                          Left Ear: abnormal hearing: degree of loss is normal to mild sensorineural hearing loss     SRT   Measurement Right Ear Left Ear   Value 5 5   Unit dB dB     Discrimination  Measurement Right Ear Left Ear   Value 100% 100%   Unit dB dB     Recommend  Further testing due to asymmetry    [address], [address] [place] [employer]  Audiologist

## 2022-06-30 NOTE — PROGRESS NOTES
[facility] ENT Office Note    Patient: Ivelisse Lee  MRN: [id number]  : 1980  Gender:  male  Vital Signs: Ht 5' 5.5\" (1.664 m)   Wt 205 lb (93 kg)   BMI 33.59 kg/m²   Date: 2022    CC:   Chief Complaint   Patient presents with    Tinnitus     Patient presens today with c/o bilateral tinnitus , clicking sensation , as well as bilateral fullness x 6 months. Maria Ines Hernandez also notes that he has congestion , he uses a CPAP.        HPI:  Ivelisse Lee is a 40 y.o. male who endorses hearing loss and bilateral tinnitus. He has any movement clicking sensation in his right ear. He also has nasal congestion and obstruction. He has used Flonase and Zyrtec for over a month without significant benefit. Past Medical History, Past Surgical History, Family history, Social History, and Medications were all reviewed with the patient today and updated as necessary. No Known Allergies  Patient Active Problem List   Diagnosis    Tobacco dependence    Dyslipidemia    Anxiety    GERD (gastroesophageal reflux disease)    PUD (peptic ulcer disease)     Current Outpatient Medications   Medication Sig    pantoprazole (PROTONIX) 40 MG tablet     fluticasone (FLONASE) 50 MCG/ACT nasal spray     SKYRIZI  MG/ML SOAJ     cetirizine (ZYRTEC) 10 MG tablet Take 10 mg by mouth daily    ALPRAZolam (XANAX) 0.25 MG tablet Take 0.25 mg by mouth daily as needed.  citalopram (CELEXA) 40 MG tablet Take 40 mg by mouth daily    clobetasol (TEMOVATE) 0.05 % cream Apply to feet BID prn.  simvastatin (ZOCOR) 40 MG tablet Take 40 mg by mouth daily     No current facility-administered medications for this visit.      Past Medical History:   Diagnosis Date    Anxiety     Dyslipidemia     GERD (gastroesophageal reflux disease)     PUD (peptic ulcer disease)     Sleep apnea     cpap     Social History     Tobacco Use    Smoking status: Current Every Day Smoker     Packs/day: 1.00    Smokeless tobacco: Never Used   Substance Use Topics    Alcohol use: No     Past Surgical History:   Procedure Laterality Date    HERNIA REPAIR      UPPER GASTROINTESTINAL ENDOSCOPY  2007    VASECTOMY       Family History   Problem Relation Age of Onset    No Known Problems Brother     No Known Problems Father     Heart Failure Mother     High Cholesterol Mother         ROS:    Review of Systems   Constitutional: Negative for activity change. HENT: Positive for congestion, ear pain and tinnitus. Negative for ear discharge. Eyes: Negative for discharge. Respiratory: Negative for apnea. Cardiovascular: Negative for chest pain. Gastrointestinal: Negative for abdominal distention. Endocrine: Negative for cold intolerance. Genitourinary: Negative for difficulty urinating. Musculoskeletal: Negative for arthralgias. Skin: Negative for color change. Allergic/Immunologic: Negative for environmental allergies. Neurological: Negative for dizziness. Hematological: Negative for adenopathy. Psychiatric/Behavioral: Negative for agitation. PHYSICAL EXAM:    Ht 5' 5.5\" (1.664 m)   Wt 205 lb (93 kg)   BMI 33.59 kg/m²     General: NAD, well-appearing  Neuro: No gross neuro deficits. CN's II-XII intact. No facial weakness. Eyes: EOMI. Pupils reactive. No periorbital edema/ecchymosis. Skin: No facial erythema, rashes or concerning lesions. Nose: No external deviations or saddling. Intranasally, septum is deviated to the right without perforations, bilateral inferior turbinate hypertrophy, right nasal valve collapse improved with the modified Martinez maneuver, left-sided tasha bullosa  Mouth: Moist mucus membranes, normal tongue/palate mobility, no concerning mucosal lesions. Oropharynx: clear with no erythema/exudate, no tonsillar hypertrophy. Ears: Normal appearing auricles, no hematomas. EACs clear with no cerumen impaction, healthy canal skin, TM's with small right TM perforation.  No middle ear effusions. Neck: Soft, supple, no palpable lateral neck masses. No parotid or submandibular masses. No thyromegaly or palpable thyroid nodules. No surgical scars. Lymphatics: No palpable cervical LAD. Resp/Lungs: No audible stridor or wheezing, CTAB  Heart: RRR  Extremities: No clubbing or cyanosis.     Audiometry     Test Performed - Comprehensive Audiogram     Type of Loss - Right Ear: normal hearing                          Left Ear: abnormal hearing: degree of loss is normal to mild sensorineural hearing loss      SRT   Measurement Right Ear Left Ear   Value 5 5   Unit dB dB      Discrimination  Measurement Right Ear Left Ear   Value 100% 100%   Unit dB dB       CBC  [address]   03/02/2022  Component      White Blood Cells   RBC   Hemoglobin   Hematocrit   MCV   MCH   MCHC   RDW   Platelets   White Blood Cell(WBC)Count   Red Blood Cell (RBC) Count     Component 03/02/2022 11/11/2020        White Blood Cells -- 8.2   RBC -- 4.89   Hemoglobin 13.8 14.6   Hematocrit 40.9 41.4   MCV 87 84.5   MCH 29.3 29.9   MCHC 33.7 35.4   RDW 12.4 11.8   Platelets 964 693   White Blood Cell(WBC)Count 6.8 --   Red Blood Cell (RBC) Count 4.71 --     [address]  03/02/2022  Component      BUN   Sodium   Potassium   Chloride   CO2   Glucose   Creatinine   Calcium   Total Protein   Albumin   ALT (SGPT)   Alkaline Phosphatase   AST   Total Bilirubin   BUN/Creatinine Ratio   Anion Gap   A/G Ratio   Estimated GFR-   Estimated GFR-Other   Modified Cockcroft-Gault Crcl   Corrected Calcium   Glucose,Serum   Bun/Creatinine Ratio   Carbon Dioxide,Total   Ast(Sgot)   Alt(Sgpt)     Component 03/02/2022 11/11/2020        BUN 11 18   Sodium 145 High     140   Potassium 4.4 3.9   Chloride 108 High     104   CO2 -- 25.7   Glucose -- 79   Creatinine 0.89 1.01   Calcium 9.2 9.2   Total Protein 6.9 7.1   Albumin 4.6 4.5   ALT (SGPT) -- 22   Alkaline Phosphatase -- 57   AST -- 20   Total Bilirubin 0.4 0.3   BUN/Creatinine Ratio -- 18   Anion Gap -- 10   A/G Ratio 2.0 1.7   Estimated GFR- -- >60   Estimated GFR-Other -- >60   Modified Cockcroft-Gault Crcl -- 85 Low        Corrected Calcium -- 8.8 Low        Glucose,Serum 89 --   Bun/Creatinine Ratio 12 --   Carbon Dioxide,Total 22 --   Ast(Sgot) 14 --   Alt(Sgpt) 22      He has MRI showing mild ethmoid sinusitis, right-sided nasal septal deviation, bilateral inferior turban hypertrophy, and left-sided tasha bullosa    ASSESSMENT and PLAN  69-year-old gentleman with right-sided nasal septal deviation, bilateral inferior turbinate hypertrophy, right nasal valve collapse improved with modified Martinez maneuver and left-sided tasha bullosa. He tried intranasal steroids without improvement. I have recommended septoplasty, turbinate reduction, right-sided vivaer, and left tasha bullosa resection. Risk include bleeding, infection, cosmetic changes to the nose, dental numbness, septal perforation, and continued obstruction. He understands and agrees to proceed. He also has a small right tympanic membrane perforation and repair was discussed but he declined. ICD-10-CM    1. Sensorineural hearing loss (SNHL) of left ear with unrestricted hearing of right ear  H90.42 COMPREHENSIVE HEARING TEST   2. Perforation of right tympanic membrane  H72.91    3. Nasal obstruction  J34.89    4. Allergic rhinitis, unspecified seasonality, unspecified trigger  J30.9    5. Nasal septal deviation  J34.2    6. Hypertrophy of both inferior nasal turbinates  J34.3    7. Nasal valve collapse  J34.89    8.  Nona Mantilla  J34.89          Toby Harvey MD  6/30/2022  Electronically signed

## 2022-07-05 ENCOUNTER — PREP FOR PROCEDURE (OUTPATIENT)
Dept: ENT CLINIC | Age: 42
End: 2022-07-05

## 2022-07-05 PROBLEM — M95.0 NASAL VALVE COLLAPSE: Status: ACTIVE | Noted: 2022-07-05

## 2022-07-05 PROBLEM — J34.2 DEVIATED NASAL SEPTUM: Status: ACTIVE | Noted: 2022-07-05

## 2022-07-05 PROBLEM — J34.89 NASAL OBSTRUCTION: Status: ACTIVE | Noted: 2022-07-05

## 2022-07-05 PROBLEM — J34.3 HYPERTROPHY OF BOTH INFERIOR NASAL TURBINATES: Status: ACTIVE | Noted: 2022-07-05

## 2022-07-05 PROBLEM — J34.89 NASAL VALVE COLLAPSE: Status: ACTIVE | Noted: 2022-07-05

## 2022-07-05 PROBLEM — J34.89 CONCHA BULLOSA: Status: ACTIVE | Noted: 2022-07-05

## 2022-08-08 ENCOUNTER — OFFICE VISIT (OUTPATIENT)
Dept: SURGERY | Age: 42
End: 2022-08-08
Payer: COMMERCIAL

## 2022-08-08 VITALS
HEART RATE: 67 BPM | WEIGHT: 205.4 LBS | SYSTOLIC BLOOD PRESSURE: 107 MMHG | DIASTOLIC BLOOD PRESSURE: 69 MMHG | BODY MASS INDEX: 33.66 KG/M2

## 2022-08-08 DIAGNOSIS — K40.90 LEFT INGUINAL HERNIA: Primary | ICD-10-CM

## 2022-08-08 DIAGNOSIS — L05.91 PILONIDAL CYST WITHOUT ABSCESS: ICD-10-CM

## 2022-08-08 PROCEDURE — 99204 OFFICE O/P NEW MOD 45 MIN: CPT | Performed by: SURGERY

## 2022-08-08 ASSESSMENT — ENCOUNTER SYMPTOMS
RESPIRATORY NEGATIVE: 1
EYES NEGATIVE: 1
ALLERGIC/IMMUNOLOGIC NEGATIVE: 1
NAUSEA: 0
CONSTIPATION: 0
ABDOMINAL PAIN: 0
DIARRHEA: 0

## 2022-08-08 NOTE — PROGRESS NOTES
8/8/2022    Hilary Wu  MRN: [id number]      CHIEF COMPLAINT: left inguinal hernia and pilonidal cyst      PRIMARY CARE PHYSICIAN: Sheldon Byers MD      HISTORY: Reports a left inguinal hernia for about 1 year. Gets occasional but minimal discomfort. No bulge. No changes in bowel or bladder habits. Also reports a pilonidal cyst at the top of the gluteal cleft to the left. It will swell up intermittently and become tender. He is then able to press on it and drain some blood tinged fluid making it feel better. No prior infections. REVIEW OF SYSTEMS:  Review of Systems   Constitutional:  Negative for chills, fatigue and fever. HENT: Negative. Eyes: Negative. Respiratory: Negative. Cardiovascular: Negative. Gastrointestinal:  Negative for abdominal pain, constipation, diarrhea and nausea. Endocrine: Negative. Genitourinary:  Negative for difficulty urinating, dysuria and flank pain. Musculoskeletal: Negative. Skin: Negative. Allergic/Immunologic: Negative. Neurological: Negative. Hematological: Negative. Psychiatric/Behavioral:  The patient is nervous/anxious. Sleep apnea/CPAP    Past Medical History:   Diagnosis Date    Anxiety     Dyslipidemia     GERD (gastroesophageal reflux disease)     PUD (peptic ulcer disease)     Sleep apnea     cpap       Current Outpatient Medications   Medication Sig Dispense Refill    pantoprazole (PROTONIX) 40 MG tablet       fluticasone (FLONASE) 50 MCG/ACT nasal spray       SKYRIZI  MG/ML SOAJ       cetirizine (ZYRTEC) 10 MG tablet Take 10 mg by mouth daily      ALPRAZolam (XANAX) 0.25 MG tablet Take 0.25 mg by mouth daily as needed. citalopram (CELEXA) 40 MG tablet Take 40 mg by mouth daily      clobetasol (TEMOVATE) 0.05 % cream Apply to feet BID prn.      simvastatin (ZOCOR) 40 MG tablet Take 40 mg by mouth daily       No current facility-administered medications for this visit.        Family History Problem Relation Age of Onset    No Known Problems Brother     No Known Problems Father     Heart Failure Mother     High Cholesterol Mother        Social History     Socioeconomic History    Marital status:      Spouse name: None    Number of children: None    Years of education: None    Highest education level: None   Tobacco Use    Smoking status: Every Day     Packs/day: 1.00     Types: Cigarettes    Smokeless tobacco: Never   Substance and Sexual Activity    Alcohol use: No    Drug use: Never         PHYSICAL EXAMINATION:  Physical Exam  HENT:      Head: Normocephalic and atraumatic. Eyes:      Extraocular Movements: Extraocular movements intact. Pupils: Pupils are equal, round, and reactive to light. Cardiovascular:      Rate and Rhythm: Normal rate and regular rhythm. Pulmonary:      Effort: Pulmonary effort is normal.   Abdominal:      General: There is no distension. Palpations: Abdomen is soft. There is no mass. Tenderness: There is no abdominal tenderness. There is no guarding or rebound. Hernia: A hernia is present. Hernia is present in the left inguinal area. There is no hernia in the umbilical area or right inguinal area. Musculoskeletal:         General: Normal range of motion. Cervical back: Normal range of motion and neck supple. Skin:     Comments: There is a 1-2cm cyst at the top of the gluteal cleft on the left, no signs of infection and no drainage, minimal swelling today, no tenderness   Neurological:      General: No focal deficit present. Mental Status: He is alert. Psychiatric:         Mood and Affect: Mood normal.         Thought Content: Thought content normal.        /69   Pulse 67   Wt 205 lb 6.4 oz (93.2 kg)   BMI 33.66 kg/m²       STUDIES:    IMPRESSION: Left inguinal hernia. I reviewed IH and repair with him. He reports that his symptoms are minimal and he prefers to observe for now.  The pilonidal cyst bothers him more by report and he would like to consider excision. He needs to talk to family and work about timing and will call to schedule. I reviewed the surgery and risks with him briefly today. Discussed risks of bleeding, infection, damage to nerves/vessels, scarring, recurrence, wound healing problems, need for additional procedures. PLAN:  He will call to schedule surgery. Call with questions or problems.           Daniel Morley MD

## 2022-08-19 NOTE — PROCEDURE: INJECTION
Medication (1) And Associated J-Code Units: Cosentyx, 150mg negative normal/regular rate and rhythm/S1 S2 present/no gallops/no rub/no murmur/vascular

## 2022-09-02 ENCOUNTER — PREP FOR PROCEDURE (OUTPATIENT)
Dept: SURGERY | Age: 42
End: 2022-09-02

## 2022-09-02 NOTE — PERIOP NOTE
Patient verified name and . Order for consent found in EHR and matches case posting; patient verifies procedure. Type 1B surgery, phone assessment complete. Orders received. Labs per surgeon: none  Labs per anesthesia protocol: none    Patient answered medical/surgical history questions at their best of ability. All prior to admission medications documented in [facility]. Patient instructed to take the following medications the day of surgery according to anesthesia guidelines with a small sip of water: Xanax PRN, Celexa, Zyrtec, Simvastatin, Flonase, Protonix. On the day before surgery please take Acetaminophen 1000mg in the morning and then again before bed. You may substitute for Tylenol 650 mg. Hold all vitamins 7 days prior to surgery and NSAIDS 5 days prior to surgery. Patient instructed on the following:    > Arrive at [address], time of arrival to be called the day before by 1700  > NPO after midnight, unless otherwise indicated, including gum, mints, and ice chips  > Responsible adult must drive patient to the hospital, stay during surgery, and patient will need supervision 24 hours after anesthesia  > Use anti-bacterial soap in shower the night before surgery and on the morning of surgery  > All piercings must be removed prior to arrival.    > Leave all valuables (money and jewelry) at home but bring insurance card and ID on DOS.   > You may be required to pay a deductible or co-pay on the day of your procedure. You can pre-pay by calling [phone] if your surgery is at the [facility] or [phone] if your surgery is at the [facility]. > Do not wear make-up, nail polish, lotions, cologne, perfumes, powders, or oil on skin. Artificial nails are not permitted.

## 2022-09-07 ENCOUNTER — TELEPHONE (OUTPATIENT)
Dept: ENT CLINIC | Age: 42
End: 2022-09-07

## 2022-09-07 NOTE — TELEPHONE ENCOUNTER
Patient rescheduled surgery from 9/9/2022 to 01/06/2022. He has to have other procedures and wanted to wait until after the [address] [place] [address] for insurance.

## 2022-12-27 ENCOUNTER — PREP FOR PROCEDURE (OUTPATIENT)
Dept: SURGERY | Age: 42
End: 2022-12-27

## 2022-12-29 NOTE — PERIOP NOTE
Patient verified name and . Order for consent found in EHR. Type 1B surgery, PAT phone assessment complete. Orders received. Labs per surgeon: none  Labs per anesthesia protocol: none    Patient answered medical/surgical history questions at their best of ability. All prior to admission medications documented in [facility] Care. Patient instructed to take the following medications the day of surgery according to anesthesia guidelines with a small sip of water: xanax if needed, celexa, protonix, zocor, zyrtec. On the day before surgery please take Acetaminophen 1000mg in the morning and then again before bed. You may substitute for Tylenol 650 mg. Hold all vitamins 7 days prior to surgery and NSAIDS 5 days prior to surgery. Patient instructed on the following:    > Arrive at A Entrance, time of arrival to be called the day before by 1700  > NPO after midnight, unless otherwise indicated, including gum, mints, and ice chips  > Responsible adult must drive patient to the hospital, stay during surgery, and patient will need supervision 24 hours after anesthesia  > Use antibacterial soap in shower the night before surgery and on the morning of surgery  > All piercings must be removed prior to arrival.    > Leave all valuables (money and jewelry) at home but bring insurance card and ID on DOS.   > Do not wear make-up, nail polish, lotions, cologne, perfumes, powders, or oil on skin. Artificial nails are not permitted.

## 2023-01-06 ENCOUNTER — ANESTHESIA EVENT (OUTPATIENT)
Dept: SURGERY | Age: 43
End: 2023-01-06
Payer: COMMERCIAL

## 2023-01-06 ENCOUNTER — HOSPITAL ENCOUNTER (OUTPATIENT)
Age: 43
Setting detail: OUTPATIENT SURGERY
Discharge: HOME | End: 2023-01-06
Attending: STUDENT IN AN ORGANIZED HEALTH CARE EDUCATION/TRAINING PROGRAM | Admitting: STUDENT IN AN ORGANIZED HEALTH CARE EDUCATION/TRAINING PROGRAM
Payer: COMMERCIAL

## 2023-01-06 ENCOUNTER — ANESTHESIA (OUTPATIENT)
Dept: SURGERY | Age: 43
End: 2023-01-06
Payer: COMMERCIAL

## 2023-01-06 VITALS
WEIGHT: 206 LBS | BODY MASS INDEX: 33.11 KG/M2 | HEIGHT: 66 IN | HEART RATE: 70 BPM | DIASTOLIC BLOOD PRESSURE: 91 MMHG | TEMPERATURE: 97.4 F | SYSTOLIC BLOOD PRESSURE: 140 MMHG | OXYGEN SATURATION: 97 % | RESPIRATION RATE: 16 BRPM

## 2023-01-06 DIAGNOSIS — J34.89 NASAL VALVE COLLAPSE: ICD-10-CM

## 2023-01-06 DIAGNOSIS — G89.18 POST-OP PAIN: Primary | ICD-10-CM

## 2023-01-06 DIAGNOSIS — J34.89 NASAL OBSTRUCTION: ICD-10-CM

## 2023-01-06 DIAGNOSIS — J34.89 CONCHA BULLOSA: ICD-10-CM

## 2023-01-06 DIAGNOSIS — J34.3 HYPERTROPHY OF BOTH INFERIOR NASAL TURBINATES: ICD-10-CM

## 2023-01-06 DIAGNOSIS — J34.2 DEVIATED NASAL SEPTUM: ICD-10-CM

## 2023-01-06 PROCEDURE — 2709999900 HC NON-CHARGEABLE SUPPLY: Performed by: STUDENT IN AN ORGANIZED HEALTH CARE EDUCATION/TRAINING PROGRAM

## 2023-01-06 PROCEDURE — 2500000003 HC RX 250 WO HCPCS: Performed by: NURSE ANESTHETIST, CERTIFIED REGISTERED

## 2023-01-06 PROCEDURE — 2580000003 HC RX 258: Performed by: ANESTHESIOLOGY

## 2023-01-06 PROCEDURE — 6370000000 HC RX 637 (ALT 250 FOR IP): Performed by: ANESTHESIOLOGY

## 2023-01-06 PROCEDURE — A4216 STERILE WATER/SALINE, 10 ML: HCPCS | Performed by: STUDENT IN AN ORGANIZED HEALTH CARE EDUCATION/TRAINING PROGRAM

## 2023-01-06 PROCEDURE — 6360000002 HC RX W HCPCS: Performed by: ANESTHESIOLOGY

## 2023-01-06 PROCEDURE — 2500000003 HC RX 250 WO HCPCS: Performed by: ANESTHESIOLOGY

## 2023-01-06 PROCEDURE — 7100000000 HC PACU RECOVERY - FIRST 15 MIN: Performed by: STUDENT IN AN ORGANIZED HEALTH CARE EDUCATION/TRAINING PROGRAM

## 2023-01-06 PROCEDURE — 2500000003 HC RX 250 WO HCPCS: Performed by: STUDENT IN AN ORGANIZED HEALTH CARE EDUCATION/TRAINING PROGRAM

## 2023-01-06 PROCEDURE — 2580000003 HC RX 258: Performed by: STUDENT IN AN ORGANIZED HEALTH CARE EDUCATION/TRAINING PROGRAM

## 2023-01-06 PROCEDURE — 2580000003 HC RX 258: Performed by: NURSE ANESTHETIST, CERTIFIED REGISTERED

## 2023-01-06 PROCEDURE — 2720000010 HC SURG SUPPLY STERILE: Performed by: STUDENT IN AN ORGANIZED HEALTH CARE EDUCATION/TRAINING PROGRAM

## 2023-01-06 PROCEDURE — 3700000000 HC ANESTHESIA ATTENDED CARE: Performed by: STUDENT IN AN ORGANIZED HEALTH CARE EDUCATION/TRAINING PROGRAM

## 2023-01-06 PROCEDURE — 7100000001 HC PACU RECOVERY - ADDTL 15 MIN: Performed by: STUDENT IN AN ORGANIZED HEALTH CARE EDUCATION/TRAINING PROGRAM

## 2023-01-06 PROCEDURE — 3600000004 HC SURGERY LEVEL 4 BASE: Performed by: STUDENT IN AN ORGANIZED HEALTH CARE EDUCATION/TRAINING PROGRAM

## 2023-01-06 PROCEDURE — 7100000010 HC PHASE II RECOVERY - FIRST 15 MIN: Performed by: STUDENT IN AN ORGANIZED HEALTH CARE EDUCATION/TRAINING PROGRAM

## 2023-01-06 PROCEDURE — 3600000014 HC SURGERY LEVEL 4 ADDTL 15MIN: Performed by: STUDENT IN AN ORGANIZED HEALTH CARE EDUCATION/TRAINING PROGRAM

## 2023-01-06 PROCEDURE — 6370000000 HC RX 637 (ALT 250 FOR IP): Performed by: STUDENT IN AN ORGANIZED HEALTH CARE EDUCATION/TRAINING PROGRAM

## 2023-01-06 PROCEDURE — 6360000002 HC RX W HCPCS: Performed by: NURSE ANESTHETIST, CERTIFIED REGISTERED

## 2023-01-06 PROCEDURE — 3700000001 HC ADD 15 MINUTES (ANESTHESIA): Performed by: STUDENT IN AN ORGANIZED HEALTH CARE EDUCATION/TRAINING PROGRAM

## 2023-01-06 RX ORDER — PROCHLORPERAZINE EDISYLATE 5 MG/ML
5 INJECTION INTRAMUSCULAR; INTRAVENOUS
Status: DISCONTINUED | OUTPATIENT
Start: 2023-01-06 | End: 2023-01-06 | Stop reason: HOSPADM

## 2023-01-06 RX ORDER — FENTANYL CITRATE 50 UG/ML
INJECTION, SOLUTION INTRAMUSCULAR; INTRAVENOUS PRN
Status: DISCONTINUED | OUTPATIENT
Start: 2023-01-06 | End: 2023-01-06 | Stop reason: SDUPTHER

## 2023-01-06 RX ORDER — ONDANSETRON 2 MG/ML
INJECTION INTRAMUSCULAR; INTRAVENOUS PRN
Status: DISCONTINUED | OUTPATIENT
Start: 2023-01-06 | End: 2023-01-06 | Stop reason: SDUPTHER

## 2023-01-06 RX ORDER — LIDOCAINE HYDROCHLORIDE AND EPINEPHRINE 10; 10 MG/ML; UG/ML
INJECTION, SOLUTION INFILTRATION; PERINEURAL PRN
Status: DISCONTINUED | OUTPATIENT
Start: 2023-01-06 | End: 2023-01-06 | Stop reason: HOSPADM

## 2023-01-06 RX ORDER — ONDANSETRON 4 MG/1
4 TABLET, ORALLY DISINTEGRATING ORAL 3 TIMES DAILY PRN
Qty: 12 TABLET | Refills: 0 | Status: SHIPPED | OUTPATIENT
Start: 2023-01-06

## 2023-01-06 RX ORDER — NEOSTIGMINE METHYLSULFATE 1 MG/ML
INJECTION, SOLUTION INTRAVENOUS PRN
Status: DISCONTINUED | OUTPATIENT
Start: 2023-01-06 | End: 2023-01-06 | Stop reason: SDUPTHER

## 2023-01-06 RX ORDER — OXYCODONE HYDROCHLORIDE 5 MG/1
5 TABLET ORAL
Status: COMPLETED | OUTPATIENT
Start: 2023-01-06 | End: 2023-01-06

## 2023-01-06 RX ORDER — SODIUM CHLORIDE 0.9 % (FLUSH) 0.9 %
5-40 SYRINGE (ML) INJECTION PRN
Status: DISCONTINUED | OUTPATIENT
Start: 2023-01-06 | End: 2023-01-06 | Stop reason: HOSPADM

## 2023-01-06 RX ORDER — GLYCOPYRROLATE 0.2 MG/ML
INJECTION INTRAMUSCULAR; INTRAVENOUS PRN
Status: DISCONTINUED | OUTPATIENT
Start: 2023-01-06 | End: 2023-01-06 | Stop reason: SDUPTHER

## 2023-01-06 RX ORDER — EPHEDRINE SULFATE/0.9% NACL/PF 50 MG/5 ML
SYRINGE (ML) INTRAVENOUS PRN
Status: DISCONTINUED | OUTPATIENT
Start: 2023-01-06 | End: 2023-01-06 | Stop reason: SDUPTHER

## 2023-01-06 RX ORDER — CEPHALEXIN 500 MG/1
500 CAPSULE ORAL 2 TIMES DAILY
Qty: 14 CAPSULE | Refills: 0 | Status: SHIPPED | OUTPATIENT
Start: 2023-01-06 | End: 2023-01-13

## 2023-01-06 RX ORDER — SODIUM CHLORIDE 9 MG/ML
INJECTION INTRAVENOUS PRN
Status: DISCONTINUED | OUTPATIENT
Start: 2023-01-06 | End: 2023-01-06 | Stop reason: HOSPADM

## 2023-01-06 RX ORDER — OXYCODONE HYDROCHLORIDE AND ACETAMINOPHEN 5; 325 MG/1; MG/1
1 TABLET ORAL EVERY 6 HOURS PRN
Qty: 28 TABLET | Refills: 0 | Status: SHIPPED | OUTPATIENT
Start: 2023-01-06 | End: 2023-01-13

## 2023-01-06 RX ORDER — FENTANYL CITRATE 50 UG/ML
100 INJECTION, SOLUTION INTRAMUSCULAR; INTRAVENOUS
Status: DISCONTINUED | OUTPATIENT
Start: 2023-01-06 | End: 2023-01-06 | Stop reason: HOSPADM

## 2023-01-06 RX ORDER — HYDROMORPHONE HYDROCHLORIDE 1 MG/ML
0.5 INJECTION, SOLUTION INTRAMUSCULAR; INTRAVENOUS; SUBCUTANEOUS EVERY 5 MIN PRN
Status: DISCONTINUED | OUTPATIENT
Start: 2023-01-06 | End: 2023-01-06 | Stop reason: HOSPADM

## 2023-01-06 RX ORDER — SODIUM CHLORIDE 0.9 % (FLUSH) 0.9 %
5-40 SYRINGE (ML) INJECTION EVERY 12 HOURS SCHEDULED
Status: DISCONTINUED | OUTPATIENT
Start: 2023-01-06 | End: 2023-01-06 | Stop reason: HOSPADM

## 2023-01-06 RX ORDER — SODIUM CHLORIDE, SODIUM LACTATE, POTASSIUM CHLORIDE, CALCIUM CHLORIDE 600; 310; 30; 20 MG/100ML; MG/100ML; MG/100ML; MG/100ML
INJECTION, SOLUTION INTRAVENOUS CONTINUOUS PRN
Status: DISCONTINUED | OUTPATIENT
Start: 2023-01-06 | End: 2023-01-06 | Stop reason: SDUPTHER

## 2023-01-06 RX ORDER — LIDOCAINE HYDROCHLORIDE 20 MG/ML
INJECTION, SOLUTION EPIDURAL; INFILTRATION; INTRACAUDAL; PERINEURAL PRN
Status: DISCONTINUED | OUTPATIENT
Start: 2023-01-06 | End: 2023-01-06 | Stop reason: SDUPTHER

## 2023-01-06 RX ORDER — DEXAMETHASONE SODIUM PHOSPHATE 10 MG/ML
INJECTION INTRAMUSCULAR; INTRAVENOUS PRN
Status: DISCONTINUED | OUTPATIENT
Start: 2023-01-06 | End: 2023-01-06 | Stop reason: SDUPTHER

## 2023-01-06 RX ORDER — SODIUM CHLORIDE 9 MG/ML
INJECTION, SOLUTION INTRAVENOUS PRN
Status: DISCONTINUED | OUTPATIENT
Start: 2023-01-06 | End: 2023-01-06 | Stop reason: HOSPADM

## 2023-01-06 RX ORDER — MIDAZOLAM HYDROCHLORIDE 2 MG/2ML
2 INJECTION, SOLUTION INTRAMUSCULAR; INTRAVENOUS
Status: COMPLETED | OUTPATIENT
Start: 2023-01-06 | End: 2023-01-06

## 2023-01-06 RX ORDER — SODIUM CHLORIDE, SODIUM LACTATE, POTASSIUM CHLORIDE, CALCIUM CHLORIDE 600; 310; 30; 20 MG/100ML; MG/100ML; MG/100ML; MG/100ML
INJECTION, SOLUTION INTRAVENOUS CONTINUOUS
Status: DISCONTINUED | OUTPATIENT
Start: 2023-01-06 | End: 2023-01-06 | Stop reason: HOSPADM

## 2023-01-06 RX ORDER — ROCURONIUM BROMIDE 10 MG/ML
INJECTION, SOLUTION INTRAVENOUS PRN
Status: DISCONTINUED | OUTPATIENT
Start: 2023-01-06 | End: 2023-01-06 | Stop reason: SDUPTHER

## 2023-01-06 RX ORDER — EPINEPHRINE NASAL SOLUTION 1 MG/ML
SOLUTION NASAL PRN
Status: DISCONTINUED | OUTPATIENT
Start: 2023-01-06 | End: 2023-01-06 | Stop reason: HOSPADM

## 2023-01-06 RX ORDER — LIDOCAINE HYDROCHLORIDE 10 MG/ML
1 INJECTION, SOLUTION INFILTRATION; PERINEURAL
Status: COMPLETED | OUTPATIENT
Start: 2023-01-06 | End: 2023-01-06

## 2023-01-06 RX ORDER — PROPOFOL 10 MG/ML
INJECTION, EMULSION INTRAVENOUS PRN
Status: DISCONTINUED | OUTPATIENT
Start: 2023-01-06 | End: 2023-01-06 | Stop reason: SDUPTHER

## 2023-01-06 RX ADMIN — HYDROMORPHONE HYDROCHLORIDE 0.5 MG: 1 INJECTION, SOLUTION INTRAMUSCULAR; INTRAVENOUS; SUBCUTANEOUS at 12:48

## 2023-01-06 RX ADMIN — PROPOFOL 200 MG: 10 INJECTION, EMULSION INTRAVENOUS at 11:05

## 2023-01-06 RX ADMIN — MIDAZOLAM 2 MG: 1 INJECTION INTRAMUSCULAR; INTRAVENOUS at 10:38

## 2023-01-06 RX ADMIN — PHENYLEPHRINE HYDROCHLORIDE 100 MCG: 0.1 INJECTION, SOLUTION INTRAVENOUS at 11:54

## 2023-01-06 RX ADMIN — ROCURONIUM BROMIDE 50 MG: 50 INJECTION, SOLUTION INTRAVENOUS at 11:06

## 2023-01-06 RX ADMIN — GLYCOPYRROLATE 0.4 MG: 0.2 INJECTION, SOLUTION INTRAMUSCULAR; INTRAVENOUS at 12:10

## 2023-01-06 RX ADMIN — Medication 3 MG: at 12:10

## 2023-01-06 RX ADMIN — Medication 15 MG: at 11:23

## 2023-01-06 RX ADMIN — ONDANSETRON 4 MG: 2 INJECTION INTRAMUSCULAR; INTRAVENOUS at 11:05

## 2023-01-06 RX ADMIN — DEXAMETHASONE SODIUM PHOSPHATE 10 MG: 10 INJECTION INTRAMUSCULAR; INTRAVENOUS at 11:05

## 2023-01-06 RX ADMIN — FENTANYL CITRATE 100 MCG: 50 INJECTION, SOLUTION INTRAMUSCULAR; INTRAVENOUS at 11:05

## 2023-01-06 RX ADMIN — SODIUM CHLORIDE, POTASSIUM CHLORIDE, SODIUM LACTATE AND CALCIUM CHLORIDE: 600; 310; 30; 20 INJECTION, SOLUTION INTRAVENOUS at 10:18

## 2023-01-06 RX ADMIN — LIDOCAINE HYDROCHLORIDE 100 MG: 20 INJECTION, SOLUTION EPIDURAL; INFILTRATION; INTRACAUDAL; PERINEURAL at 11:05

## 2023-01-06 RX ADMIN — SODIUM CHLORIDE, SODIUM LACTATE, POTASSIUM CHLORIDE, AND CALCIUM CHLORIDE: 600; 310; 30; 20 INJECTION, SOLUTION INTRAVENOUS at 11:40

## 2023-01-06 RX ADMIN — SODIUM CHLORIDE, SODIUM LACTATE, POTASSIUM CHLORIDE, AND CALCIUM CHLORIDE: 600; 310; 30; 20 INJECTION, SOLUTION INTRAVENOUS at 10:57

## 2023-01-06 RX ADMIN — Medication 5 MG: at 11:21

## 2023-01-06 RX ADMIN — LIDOCAINE HYDROCHLORIDE 1 ML: 10 INJECTION, SOLUTION INFILTRATION; PERINEURAL at 10:17

## 2023-01-06 RX ADMIN — OXYCODONE HYDROCHLORIDE 5 MG: 5 TABLET ORAL at 12:58

## 2023-01-06 ASSESSMENT — PAIN SCALES - GENERAL
PAINLEVEL_OUTOF10: 0
PAINLEVEL_OUTOF10: 3
PAINLEVEL_OUTOF10: 0
PAINLEVEL_OUTOF10: 4
PAINLEVEL_OUTOF10: 7

## 2023-01-06 ASSESSMENT — PAIN - FUNCTIONAL ASSESSMENT: PAIN_FUNCTIONAL_ASSESSMENT: 0-10

## 2023-01-06 ASSESSMENT — LIFESTYLE VARIABLES: SMOKING_STATUS: 1

## 2023-01-06 ASSESSMENT — PAIN DESCRIPTION - DESCRIPTORS
DESCRIPTORS: SORE
DESCRIPTORS: SORE

## 2023-01-06 ASSESSMENT — PAIN DESCRIPTION - LOCATION
LOCATION: NOSE
LOCATION: NOSE

## 2023-01-06 ASSESSMENT — PAIN DESCRIPTION - PAIN TYPE
TYPE: ACUTE PAIN;SURGICAL PAIN
TYPE: ACUTE PAIN;SURGICAL PAIN

## 2023-01-06 NOTE — ANESTHESIA PRE PROCEDURE
Department of Anesthesiology  Preprocedure Note       Name:  Gudelia Flores   Age:  40 y.o.  :  1980                                          MRN:  [id number]         Date:  2023      Surgeon: Lord Frost):  Jared Galaviz MD    Procedure: Procedure(s):  SINUS ENDOSCOPY FUNCTIONAL SURGERY Septoplasty, Bilateral Submucous Resection Inferior Turbinates, Left Eden Bullosa Resection   Right-Sided Radiofrequency Ablation to the Nasal Sidewall with Almer Nim for Nasal Valve Collapse    Medications prior to admission:   Prior to Admission medications    Medication Sig Start Date End Date Taking? Authorizing Provider   cephALEXin (KEFLEX) 500 MG capsule Take 1 capsule by mouth 2 times daily for 7 days 23  Jared Galaviz MD   oxyCODONE-acetaminophen (PERCOCET) 5-325 MG per tablet Take 1 tablet by mouth every 6 hours as needed for Pain for up to 7 days. Intended supply: 3 days. Take lowest dose possible to manage pain Max Daily Amount: 4 tablets 23  Jared Galaviz MD   ondansetron (ZOFRAN-ODT) 4 MG disintegrating tablet Take 1 tablet by mouth 3 times daily as needed for Nausea or Vomiting 23   Jared Galaviz MD   pantoprazole (PROTONIX) 40 MG tablet Take 40 mg by mouth daily 22   Historical Provider, MD   fluticasone (FLONASE) 50 MCG/ACT nasal spray 1 spray by Nasal route at bedtime 22   Historical Provider, MD PALOMINO  MG/ML SOAJ Every 3 months 22   Historical Provider, MD   cetirizine (ZYRTEC) 10 MG tablet Take 10 mg by mouth daily    Historical Provider, MD   ALPRAZolam (XANAX) 0.25 MG tablet Take 0.25 mg by mouth daily as needed. Patient not taking: Reported on 2023   Ar Automatic Reconciliation   citalopram (CELEXA) 40 MG tablet Take 40 mg by mouth daily 18   Ar Automatic Reconciliation   clobetasol (TEMOVATE) 0.05 % cream Apply to feet BID prn.   Patient not taking: Reported on 2022 3/29/18   Ar Automatic Reconciliation   simvastatin (ZOCOR) 40 MG tablet Take 40 mg by mouth daily 8/22/18   Ar Automatic Reconciliation       Current medications:    Current Facility-Administered Medications   Medication Dose Route Frequency Provider Last Rate Last Admin    fentaNYL (SUBLIMAZE) injection 100 mcg  100 mcg IntraVENous Once PRN Zeynep Ibrahim MD        lactated ringers infusion   IntraVENous Continuous Zeynep Ibrahim  mL/hr at 01/06/23 1018 New Bag at 01/06/23 1018    sodium chloride flush 0.9 % injection 5-40 mL  5-40 mL IntraVENous 2 times per day Zeynep Ibrahim MD        sodium chloride flush 0.9 % injection 5-40 mL  5-40 mL IntraVENous PRN Zeynep Ibrahim MD        0.9 % sodium chloride infusion   IntraVENous PRN Zeynep Ibrahim MD        midazolam PF (VERSED) injection 2 mg  2 mg IntraVENous Once PRN Zeynep Ibrahim MD           Allergies:  No Known Allergies    Problem List:    Patient Active Problem List   Diagnosis Code    Tobacco dependence F17.200    Dyslipidemia E78.5    Anxiety F41.9    GERD (gastroesophageal reflux disease) K21.9    PUD (peptic ulcer disease) K27.9    Nasal obstruction J34.89    Deviated nasal septum J34.2    Hypertrophy of both inferior nasal turbinates J34.3    Nasal valve collapse J34.89    Eden bullosa J34.89       Past Medical History:        Diagnosis Date    Anxiety     managed with medication    Current smoker     Dyslipidemia     managed with medication    GERD (gastroesophageal reflux disease)     managed with medication    Psoriasis     PUD (peptic ulcer disease)     Sleep apnea     cpap       Past Surgical History:        Procedure Laterality Date    HERNIA REPAIR      Age 17 or 26 umbilical hernia repair    ORTHOPEDIC SURGERY  2020    Left knee repair    UPPER GASTROINTESTINAL ENDOSCOPY  2007    VASECTOMY         Social History:    Social History     Tobacco Use    Smoking status: Every Day     Packs/day: 1.00 Types: Cigarettes    Smokeless tobacco: Never   Substance Use Topics    Alcohol use: No                                Ready to quit: Not Answered  Counseling given: Not Answered      Vital Signs (Current):   Vitals:    09/02/22 0932 12/29/22 1424 01/06/23 0947   BP:   136/86   Pulse:   77   Resp:   16   Temp:   97.7 °F (36.5 °C)   TempSrc:   Temporal   SpO2:   98%   Weight: 205 lb (93 kg) 200 lb (90.7 kg) 206 lb (93.4 kg)   Height: 5' 5.5\" (1.664 m) 5' 5.5\" (1.664 m) 5' 5.5\" (1.664 m)                                              BP Readings from Last 3 Encounters:   01/06/23 136/86   08/08/22 107/69       NPO Status: Time of last liquid consumption: 2200                        Time of last solid consumption: 2200                        Date of last liquid consumption: 01/05/23                        Date of last solid food consumption: 01/05/23    BMI:   Wt Readings from Last 3 Encounters:   01/06/23 206 lb (93.4 kg)   08/08/22 205 lb 6.4 oz (93.2 kg)   06/30/22 205 lb (93 kg)     Body mass index is 33.76 kg/m². CBC: No results found for: WBC, RBC, HGB, HCT, MCV, RDW, PLT    CMP: No results found for: NA, K, CL, CO2, BUN, CREATININE, GFRAA, AGRATIO, LABGLOM, GLUCOSE, GLU, PROT, CALCIUM, BILITOT, ALKPHOS, AST, ALT    POC Tests: No results for input(s): POCGLU, POCNA, POCK, POCCL, POCBUN, POCHEMO, POCHCT in the last 72 hours.     Coags: No results found for: PROTIME, INR, APTT    HCG (If Applicable): No results found for: PREGTESTUR, PREGSERUM, HCG, HCGQUANT     ABGs: No results found for: PHART, PO2ART, XSV2LCO, YVF5TOP, BEART, O2FLAFNC     Type & Screen (If Applicable):  No results found for: LABABO, LABRH    Drug/Infectious Status (If Applicable):  No results found for: HIV, HEPCAB    COVID-19 Screening (If Applicable):   Lab Results   Component Value Date/Time    COVID19 Not Detected 12/30/2020 01:58 PM           Anesthesia Evaluation    Airway: Mallampati: II  TM distance: >3 FB   Neck ROM: full  Mouth opening: > = 3 FB   Dental: normal exam         Pulmonary:normal exam  breath sounds clear to auscultation  (+) sleep apnea: on CPAP,  current smoker                           Cardiovascular:  Exercise tolerance: good (>4 METS),           Rhythm: regular  Rate: normal                    Neuro/Psych:   (+) depression/anxiety             GI/Hepatic/Renal:   (+) GERD: well controlled,           Endo/Other:                      ROS comment: Psoriasis   Abdominal:             Vascular: Other Findings:           Anesthesia Plan      general     ASA 2       Induction: intravenous. Anesthetic plan and risks discussed with patient and spouse.                         Bekah More MD   1/6/2023

## 2023-01-06 NOTE — OP NOTE
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]    Post-Op Diagnosis: Same       Procedures: Septoplasty, bilateral inferior turbinate submucous resection, left tasha bullosa resection, radiofrequency ablation of right nasal sidewall w/ monique    Surgeon(s):  Alexandre Conway MD    Assistant:   * No surgical staff found *    Anesthesia: General    Estimated Blood Loss (mL): less than 079     Complications: None    Specimens:   * No specimens in log *    Implants:  * No implants in log *      Drains: * No LDAs found *    Findings: Right-sided nasal septal deviation, bilateral inferior turbinate hypertrophy, left tasha bullosa, right-sided nasal valve collapse    Detailed Description of Procedure: The patient was identified in preoperative holding area. Informed consent was obtained. The patient was taken back to the operating suite and laid supine on the operating table. Upper and lower extremity pressure points were padded. Anesthesia was induced and the patient was intubated without complication. The patient was draped in the usual fashion. A preoperative timeout was performed. Nose was prepped with Betadine. The septum was anesthetized with 1% lidocaine with 1:100,000 epinephrine. Afrin-soaked pledgets were placed in nasal passages bilaterally and then removed after a few minutes. A left-sided hemitransfixion incision was made using a #15 blade. A combination of the Martinez and [place] elevators were used to raise a left-sided submucoperichondrial flap. Next a 0 degree endoscope and suction Antonetta Massy were used to complete raising the left-sided submucoperichondrial and submucoperiosteal flap.    Next the suction Antonetta Massy was used to make a vertical incision in the septum approximately 1.5 cm posterior to the most caudal aspect of the septum. A right-sided submucoperichondrial and submucoperiosteal flap was raised using the suction [place]. The double-action scissors were used to make a superior cut in the septum from anterior to posterior making sure to leave a least 1.5 cm of septal strut dorsally. Next the deviated portions of the septum were removed using the [place] forceps. There was deviation inferiorly that was further removed using a [place] and [place] forceps. At this point there was significant provement in the nasal patency. Next the head of each inferior turbinate was injected with 1% lidocaine with 1:100,000 epinephrine. #15 blade was used to make a stab incision along the head of the inferior turbinate on both sides. Next a [place] elevator was used to raise submucosal flaps. The microdebrider was then inserted and submucous resection was performed first with the blade-pointing towards the bone and then another pass with the blade pointing towards the submucosa. This was performed bilaterally. Each inferior turbinate was outfractured downward and laterally using a [place] elevator. Next the left middle turbinate tasha bullosa was injected with local anesthesia. Double-action scissors and straight Izaiah-Cut were used to remove the inferior two thirds of the left middle turbinate tasha bullosa. Next the nasal valve region was injected with local anesthesia on the right side. The radiofrequency ablation wand was used to ablate the nasal valve region on the right side at 3 points, inferiorly to superiorly. Next the hemitransfixion incision was closed using 4-0 chromic. Dumas splints covered in bacitracin were placed and secured with a 3-0 nylon. The patient was extubated and taken the PACU in stable condition.      Electronically signed by Ada Carmen MD on 1/6/2023 at 12:22 PM

## 2023-01-06 NOTE — H&P
[facility] PreOp H&P Note     Patient: Irina Read  MRN: [id number]  : 1980  Gender:  male  Vital Signs: Ht 5' 5.5\" (1.664 m)   Wt 205 lb (93 kg)   BMI 33.59 kg/m²   Date: 2022     CC:        Chief Complaint   Patient presents with    Tinnitus       Patient presens today with c/o bilateral tinnitus , clicking sensation , as well as bilateral fullness x 6 months. Yennifer Lorenzo also notes that he has congestion , he uses a CPAP.          HPI:  Irina Read is a 40 y.o. male who endorses hearing loss and bilateral tinnitus. He has any movement clicking sensation in his right ear. He also has nasal congestion and obstruction. He has used Flonase and Zyrtec for over a month without significant benefit. Past Medical History, Past Surgical History, Family history, Social History, and Medications were all reviewed with the patient today and updated as necessary. No Known Allergies      Patient Active Problem List   Diagnosis    Tobacco dependence    Dyslipidemia    Anxiety    GERD (gastroesophageal reflux disease)    PUD (peptic ulcer disease)           Current Outpatient Medications   Medication Sig    pantoprazole (PROTONIX) 40 MG tablet      fluticasone (FLONASE) 50 MCG/ACT nasal spray      SKYRIZI  MG/ML SOAJ      cetirizine (ZYRTEC) 10 MG tablet Take 10 mg by mouth daily    ALPRAZolam (XANAX) 0.25 MG tablet Take 0.25 mg by mouth daily as needed. citalopram (CELEXA) 40 MG tablet Take 40 mg by mouth daily    clobetasol (TEMOVATE) 0.05 % cream Apply to feet BID prn.    simvastatin (ZOCOR) 40 MG tablet Take 40 mg by mouth daily      No current facility-administered medications for this visit.       Past Medical History        Past Medical History:   Diagnosis Date    Anxiety      Dyslipidemia      GERD (gastroesophageal reflux disease)      PUD (peptic ulcer disease)      Sleep apnea       cpap         Social History            Tobacco Use    Smoking status: Current Every Day Smoker       Packs/day: 1.00    Smokeless tobacco: Never Used   Substance Use Topics    Alcohol use: No      Past Surgical History         Past Surgical History:   Procedure Laterality Date    HERNIA REPAIR        UPPER GASTROINTESTINAL ENDOSCOPY   2007    VASECTOMY             Family History         Family History   Problem Relation Age of Onset    No Known Problems Brother      No Known Problems Father      Heart Failure Mother      High Cholesterol Mother              ROS:     Review of Systems   Constitutional: Negative for activity change. HENT: Positive for congestion, ear pain and tinnitus. Negative for ear discharge. Eyes: Negative for discharge. Respiratory: Negative for apnea. Cardiovascular: Negative for chest pain. Gastrointestinal: Negative for abdominal distention. Endocrine: Negative for cold intolerance. Genitourinary: Negative for difficulty urinating. Musculoskeletal: Negative for arthralgias. Skin: Negative for color change. Allergic/Immunologic: Negative for environmental allergies. Neurological: Negative for dizziness. Hematological: Negative for adenopathy. Psychiatric/Behavioral: Negative for agitation. PHYSICAL EXAM:     Ht 5' 5.5\" (1.664 m)   Wt 205 lb (93 kg)   BMI 33.59 kg/m²      General: NAD, well-appearing  Neuro: No gross neuro deficits. CN's II-XII intact. No facial weakness. Eyes: EOMI. Pupils reactive. No periorbital edema/ecchymosis. Skin: No facial erythema, rashes or concerning lesions. Nose: No external deviations or saddling. Intranasally, septum is deviated to the right without perforations, bilateral inferior turbinate hypertrophy, right nasal valve collapse improved with the modified Martinez maneuver, left-sided tasha bullosa  Mouth: Moist mucus membranes, normal tongue/palate mobility, no concerning mucosal lesions. Oropharynx: clear with no erythema/exudate, no tonsillar hypertrophy.   Ears: Normal appearing auricles, no hematomas. EACs clear with no cerumen impaction, healthy canal skin, TM's with small right TM perforation. No middle ear effusions. Neck: Soft, supple, no palpable lateral neck masses. No parotid or submandibular masses. No thyromegaly or palpable thyroid nodules. No surgical scars. Lymphatics: No palpable cervical LAD. Resp/Lungs: No audible stridor or wheezing, CTAB  Heart: RRR  Extremities: No clubbing or cyanosis.      Audiometry     Test Performed - Comprehensive Audiogram     Type of Loss - Right Ear: normal hearing                          Left Ear: abnormal hearing: degree of loss is normal to mild sensorineural hearing loss      SRT   Measurement Right Ear Left Ear   Value 5 5   Unit dB dB      Discrimination  Measurement Right Ear Left Ear   Value 100% 100%   Unit dB dB         CBC  [address]  03/02/2022  Component       White Blood Cells   RBC   Hemoglobin   Hematocrit   MCV   MCH   MCHC   RDW   Platelets   White Blood Cell(WBC)Count   Red Blood Cell (RBC) Count      Component 03/02/2022 11/11/2020           White Blood Cells -- 8.2   RBC -- 4.89   Hemoglobin 13.8 14.6   Hematocrit 40.9 41.4   MCV 87 84.5   MCH 29.3 29.9   MCHC 33.7 35.4   RDW 12.4 11.8   Platelets 365 054   White Blood Cell(WBC)Count 6.8 --   Red Blood Cell (RBC) Count 4.71 --      [facility]  03/02/2022  Component       BUN   Sodium   Potassium   Chloride   CO2   Glucose   Creatinine   Calcium   Total Protein   Albumin   ALT (SGPT)   Alkaline Phosphatase   AST   Total Bilirubin   BUN/Creatinine Ratio   Anion Gap   A/G Ratio   Estimated GFR-   Estimated GFR-Other   Modified Cockcroft-Gault Crcl   Corrected Calcium   Glucose,Serum   Bun/Creatinine Ratio   Carbon Dioxide,Total   Ast(Sgot)   Alt(Sgpt)      Component 03/02/2022 11/11/2020           BUN 11 18   Sodium 145 High     140   Potassium 4.4 3.9   Chloride 108 High     104   CO2 -- 25.7 Glucose -- 79   Creatinine 0.89 1.01   Calcium 9.2 9.2   Total Protein 6.9 7.1   Albumin 4.6 4.5   ALT (SGPT) -- 22   Alkaline Phosphatase -- 57   AST -- 20   Total Bilirubin 0.4 0.3   BUN/Creatinine Ratio -- 18   Anion Gap -- 10   A/G Ratio 2.0 1.7   Estimated GFR- -- >60   Estimated GFR-Other -- >60   Modified Cockcroft-Gault Crcl -- 85 Low        Corrected Calcium -- 8.8 Low        Glucose,Serum 89 --   Bun/Creatinine Ratio 12 --   Carbon Dioxide,Total 22 --   Ast(Sgot) 14 --   Alt(Sgpt) 22        He has MRI showing mild ethmoid sinusitis, right-sided nasal septal deviation, bilateral inferior turban hypertrophy, and left-sided tasha bullosa     ASSESSMENT and PLAN  79-year-old gentleman with right-sided nasal septal deviation, bilateral inferior turbinate hypertrophy, right nasal valve collapse improved with modified Martinez maneuver and left-sided tasha bullosa. He tried intranasal steroids without improvement. I have recommended septoplasty, turbinate reduction, right-sided vivaer, and left tasha bullosa resection. Risk include bleeding, infection, cosmetic changes to the nose, dental numbness, septal perforation, and continued obstruction. He understands and agrees to proceed. He also has a small right tympanic membrane perforation and repair was discussed but he declined. ICD-10-CM     1. Sensorineural hearing loss (SNHL) of left ear with unrestricted hearing of right ear  H90.42 COMPREHENSIVE HEARING TEST   2. Perforation of right tympanic membrane  H72.91     3. Nasal obstruction  J34.89     4. Allergic rhinitis, unspecified seasonality, unspecified trigger  J30.9     5. Nasal septal deviation  J34.2     6. Hypertrophy of both inferior nasal turbinates  J34.3     7. Nasal valve collapse  J34.89     8.  Angelia Caesar  J34.89              Jl Donnelly MD

## 2023-01-06 NOTE — ANESTHESIA POSTPROCEDURE EVALUATION
Department of Anesthesiology  Postprocedure Note    Patient: Lilia Lopez  MRN: [id number]  YOB: 1980  Date of evaluation: 1/6/2023      Procedure Summary     Date: 01/06/23 Room / [place] [facility] [place] 02 / [facility] [place]    Anesthesia Start: 1056 Anesthesia Stop: 2704    Procedures:       SINUS ENDOSCOPY FUNCTIONAL SURGERY Septoplasty, Bilateral Submucous Resection Inferior Turbinates, Left Eden Bullosa Resection  (Nose)      Right-Sided Radiofrequency Ablation to the Nasal Sidewall with Earleen Honor for Nasal Valve Collapse (Right: Nose) Diagnosis:       Nasal obstruction      Deviated nasal septum      Hypertrophy of both inferior nasal turbinates      Nasal valve collapse      Eden bullosa      (Nasal obstruction [J34.89])      (Deviated nasal septum [J34.2])      (Hypertrophy of both inferior nasal turbinates [J34.3])      (Nasal valve collapse [J34.89])      (Eden bullosa [J34.89])    Surgeons: Zina El MD Responsible Provider: Chong Perdue MD    Anesthesia Type: general ASA Status: 2          Anesthesia Type: No value filed.     Victoriano Phase I: Victoriano Score: 10    Victoriano Phase II:        Anesthesia Post Evaluation    Patient location during evaluation: PACU  Patient participation: complete - patient participated  Level of consciousness: awake and alert  Airway patency: patent  Nausea & Vomiting: no nausea and no vomiting  Complications: no  Cardiovascular status: hemodynamically stable  Respiratory status: acceptable, nonlabored ventilation and spontaneous ventilation  Hydration status: euvolemic  Comments: BP (!) 144/90   Pulse 70   Temp 97.4 °F (36.3 °C) (Tympanic)   Resp 16   Ht 5' 5.5\" (1.664 m)   Wt 206 lb (93.4 kg)   SpO2 97%   BMI 33.76 kg/m²     Multimodal analgesia pain management approach

## 2023-01-06 NOTE — DISCHARGE INSTRUCTIONS
Septoplasty         -If needed after surgery, sleep and rest with your head elevated to decrease swelling and pressure.    -The first day or two after surgery, you may have some mild bloody drainage. You can wear a gauze pad under your nose to catch these drippings. This can be changed as needed. CALL THE OFFICE IF YOU HAVE ANY HEAVY OR PERSISTENT BLEEDING    -Numbness to the front teeth, roof of mouth is normal after nasal surgery, and will resolve after about 8 weeks. -VERY IMPORTANT. You will have splints in your nose. These need to be kept clean.      -Nasal irrigation with a normal saline rinse OR SPRAY after surgery is important. This is begun the day of surgery. You cannot do this too much. At a minimum of 5 time a day. -Use plenty of plain saline spray, as well. This helps keep the splints open and clean. -You may irrigate blood clots from your nose for up to 2 weeks after surgery. IRRIGATION IS VERY IMPORTANT. You may use additionally use Afrin 2-3 times daily for the first 3 days to decrease congestion and bleeding.      -You will be taking an oral antibiotic while the splints are in place.    -Do not blow your nose until you've had your post-operative visit and gotten clearance from your doctor.    -If you to need sneeze, sneeze with your mouth open.    -Nasal congestion after nasal surgery is normal and will usually resolve once your splints are removed. Keep in mind, this is not immediate as you will still be swollen and healing.          After general anesthesia or intravenous sedation, for 24 hours or while taking prescription Narcotics:  Limit your activities  A responsible adult needs to be with you for the next 24 hours  Do not drive and operate hazardous machinery  Do not make important personal or business decisions  Do not drink alcoholic beverages  If you have not urinated within 8 hours after discharge, and you are experiencing discomfort from urinary retention, please go to the nearest ED. If you have sleep apnea and have a CPAP machine, please use it for all naps and sleeping. Please use caution when taking narcotics and any of your home medications that may cause drowsiness. *  Please give a list of your current medications to your Primary Care Provider. *  Please update this list whenever your medications are discontinued, doses are      changed, or new medications (including over-the-counter products) are added. *  Please carry medication information at all times in case of emergency situations. These are general instructions for a healthy lifestyle:  No smoking/ No tobacco products/ Avoid exposure to second hand smoke  Surgeon General's Warning:  Quitting smoking now greatly reduces serious risk to your health. Obesity, smoking, and sedentary lifestyle greatly increases your risk for illness  A healthy diet, regular physical exercise & weight monitoring are important for maintaining a healthy lifestyle    You may be retaining fluid if you have a history of heart failure or if you experience any of the following symptoms:  Weight gain of 3 pounds or more overnight or 5 pounds in a week, increased swelling in our hands or feet or shortness of breath while lying flat in bed. Please call your doctor as soon as you notice any of these symptoms; do not wait until your next office visit.

## 2023-01-12 ENCOUNTER — OFFICE VISIT (OUTPATIENT)
Dept: ENT CLINIC | Age: 43
End: 2023-01-12
Payer: COMMERCIAL

## 2023-01-12 VITALS — BODY MASS INDEX: 34.32 KG/M2 | RESPIRATION RATE: 22 BRPM | WEIGHT: 206 LBS | HEIGHT: 65 IN | OXYGEN SATURATION: 100 %

## 2023-01-12 DIAGNOSIS — J34.89 NASAL OBSTRUCTION: Primary | ICD-10-CM

## 2023-01-12 PROCEDURE — 31231 NASAL ENDOSCOPY DX: CPT | Performed by: STUDENT IN AN ORGANIZED HEALTH CARE EDUCATION/TRAINING PROGRAM

## 2023-01-12 ASSESSMENT — ENCOUNTER SYMPTOMS
EYE DISCHARGE: 0
ABDOMINAL PAIN: 0
COUGH: 0

## 2023-01-12 NOTE — PROGRESS NOTES
[address] ENT Office Note    Patient: Kathy Platt  MRN: [id number]  : 1980  Gender:  male  Vital Signs: Resp 22   Ht 5' 5\" (1.651 m)   Wt 206 lb (93.4 kg)   SpO2 100%   BMI 34.28 kg/m²   Date: 2023    CC:   Chief Complaint   Patient presents with    Follow-up     Patient here for post-op visit. HPI:  Kathy Platt is a 40 y.o. male status post septoplasty, turbinate reduction, left tasha bullosa resection, and you have he can see a lesion to the nasal sidewall on the right on 2023. He is doing well. He is ready for Dumas splints to come out. Past Medical History, Past Surgical History, Family history, Social History, and Medications were all reviewed with the patient today and updated as necessary. No Known Allergies  Patient Active Problem List   Diagnosis    Tobacco dependence    Dyslipidemia    Anxiety    GERD (gastroesophageal reflux disease)    PUD (peptic ulcer disease)    Nasal obstruction    Deviated nasal septum    Hypertrophy of both inferior nasal turbinates    Nasal valve collapse    Tasha bullosa     Current Outpatient Medications   Medication Sig    cephALEXin (KEFLEX) 500 MG capsule Take 1 capsule by mouth 2 times daily for 7 days    oxyCODONE-acetaminophen (PERCOCET) 5-325 MG per tablet Take 1 tablet by mouth every 6 hours as needed for Pain for up to 7 days. Intended supply: 3 days. Take lowest dose possible to manage pain Max Daily Amount: 4 tablets    ondansetron (ZOFRAN-ODT) 4 MG disintegrating tablet Take 1 tablet by mouth 3 times daily as needed for Nausea or Vomiting    pantoprazole (PROTONIX) 40 MG tablet Take 40 mg by mouth daily    fluticasone (FLONASE) 50 MCG/ACT nasal spray 1 spray by Nasal route at bedtime    SKYRIZI  MG/ML SOAJ Every 3 months    cetirizine (ZYRTEC) 10 MG tablet Take 10 mg by mouth daily    ALPRAZolam (XANAX) 0.25 MG tablet Take 0.25 mg by mouth daily as needed.     citalopram (CELEXA) 40 MG tablet Take 40 mg by mouth daily    clobetasol (TEMOVATE) 0.05 % cream Apply to feet BID prn.    simvastatin (ZOCOR) 40 MG tablet Take 40 mg by mouth daily     No current facility-administered medications for this visit. Past Medical History:   Diagnosis Date    Anxiety     managed with medication    Current smoker     Dyslipidemia     managed with medication    GERD (gastroesophageal reflux disease)     managed with medication    Psoriasis     PUD (peptic ulcer disease)     Sleep apnea     cpap     Social History     Tobacco Use    Smoking status: Every Day     Packs/day: 1.00     Types: Cigarettes    Smokeless tobacco: Never   Substance Use Topics    Alcohol use: No     Past Surgical History:   Procedure Laterality Date    HERNIA REPAIR      Age 17 or 26 umbilical hernia repair    NOSE SURGERY Right 1/6/2023    Right-Sided Radiofrequency Ablation to the Nasal Sidewall with Jeralene Ogles for Nasal Valve Collapse performed by Aicha Maldonado MD at [address] [place] [address]  2020    Left knee repair    SINUS ENDOSCOPY N/A 1/6/2023    SINUS ENDOSCOPY FUNCTIONAL SURGERY Septoplasty, Bilateral Submucous Resection Inferior Turbinates, Left Eden Bullosa Resection  performed by Aicha Maldonado MD at [facility] MAIN OR    UPPER GASTROINTESTINAL ENDOSCOPY  2007    VASECTOMY       Family History   Problem Relation Age of Onset    No Known Problems Brother     No Known Problems Father     Heart Failure Mother     High Cholesterol Mother         ROS:    Review of Systems   Constitutional:  Negative for fever. HENT:  Negative for ear discharge and ear pain. Eyes:  Negative for discharge. Respiratory:  Negative for cough. Cardiovascular:  Negative for chest pain. Gastrointestinal:  Negative for abdominal pain. Genitourinary:  Negative for difficulty urinating. Musculoskeletal:  Negative for neck pain. Skin:  Negative for rash. Allergic/Immunologic: Negative for environmental allergies.    Neurological:  Negative for dizziness. Hematological:  Negative for adenopathy. Psychiatric/Behavioral:  Negative for agitation. PHYSICAL EXAM:    Resp 22   Ht 5' 5\" (1.651 m)   Wt 206 lb (93.4 kg)   SpO2 100%   BMI 34.28 kg/m²     General: NAD, well-appearing  Neuro: No gross neuro deficits. CN's II-XII intact. No facial weakness. Eyes: EOMI. Pupils reactive. No periorbital edema/ecchymosis. Skin: No facial erythema, rashes or concerning lesions. Nose: No external deviations or saddling. Intranasally, septum is midline without perforations, nasal mucosa appears healthy with no erythema, mucopurulence, or polyps. Dumas splints removed  Mouth: Moist mucus membranes, normal tongue/palate mobility, no concerning mucosal lesions. Oropharynx: clear with no erythema/exudate, no tonsillar hypertrophy. Ears: Normal appearing auricles, no hematomas. EACs clear with no cerumen impaction, healthy canal skin, TM's intact with no perforations or retraction pockets. No middle ear effusions. Neck: Soft, supple, no palpable lateral neck masses. No parotid or submandibular masses. No thyromegaly or palpable thyroid nodules. No surgical scars. Lymphatics: No palpable cervical LAD. Resp/Lungs: No audible stridor or wheezing, CTAB  Heart: RRR  Extremities: No clubbing or cyanosis. PROCEDURE: Nasal endoscopy  INDICATIONS: nasal obstruction  DESCRIPTION: After verbal consent was obtained and a timeout was performed, the 0 degree rigid nasal endoscope was advanced into both nares. The septum was midline w/ no perforations. There were no masses seen along the nasal floor or within the nasopharynx. Blood clot and mucus was suctioned out from the nasal passages bilaterally. Alligator forceps were used to remove additional blood clot on the left side. Right ostiomeatal complex healthy-appearing. On the left side the middle turbinate had been trimmed along its inferior one half. Ostiomeatal complex clear.   The sphenoethmoidal recess was examined bilaterally and was free of pus or polyposis. The scope was then removed and the patient tolerated the procedure well w/ no complications. ASSESSMENT and PLAN   40 y.o. male status post septoplasty, turbinate reduction, left tasha bullosa resection, and you have he can see a lesion to the nasal sidewall on the right on 1-6-2023. He is doing well. His anoscopy looks good. I will see him back in 3 months. ICD-10-CM    1. Nasal obstruction  J34.89 NASAL ENDOSCOPY,DX            Alfa Faulkner MD  1/12/2023  Electronically signed    Note dictated using voice recognition software. Please excuse any typos.

## 2023-05-03 ENCOUNTER — PREP FOR PROCEDURE (OUTPATIENT)
Dept: SURGERY | Age: 43
End: 2023-05-03

## 2023-05-10 ENCOUNTER — OFFICE VISIT (OUTPATIENT)
Dept: SURGERY | Age: 43
End: 2023-05-10
Payer: COMMERCIAL

## 2023-05-10 VITALS
SYSTOLIC BLOOD PRESSURE: 134 MMHG | WEIGHT: 207.6 LBS | HEART RATE: 89 BPM | DIASTOLIC BLOOD PRESSURE: 86 MMHG | BODY MASS INDEX: 34.59 KG/M2 | HEIGHT: 65 IN

## 2023-05-10 DIAGNOSIS — L05.91 PILONIDAL CYST WITHOUT ABSCESS: Primary | ICD-10-CM

## 2023-05-10 PROCEDURE — 99214 OFFICE O/P EST MOD 30 MIN: CPT | Performed by: SURGERY

## 2023-05-10 ASSESSMENT — ENCOUNTER SYMPTOMS
RESPIRATORY NEGATIVE: 1
GASTROINTESTINAL NEGATIVE: 1

## 2023-05-10 NOTE — PROGRESS NOTES
5/10/2023    Maria Eugenia Au  MRN: [id number]      CHIEF COMPLAINT: Pain and drainage of pilonidal cyst      PRIMARY CARE PHYSICIAN: Miranda Rolon MD      HISTORY:  History of a pilonidal cyst.  We are in the process of scheduling him for pilonidal cystectomy. He is here for preoperative check. He tells me he is having more pain and drainage from the pilonidal cyst.  Does not believe he has any infection. He also wants to discuss postoperative recovery and restrictions. REVIEW OF SYSTEMS:  Review of Systems   Constitutional: Negative. Respiratory: Negative. Cardiovascular: Negative. Gastrointestinal: Negative. Genitourinary: Negative. Past Medical History:   Diagnosis Date    Anxiety     managed with medication    Current smoker     Dyslipidemia     managed with medication    GERD (gastroesophageal reflux disease)     managed with medication    Psoriasis     PUD (peptic ulcer disease)     Sleep apnea     cpap       Current Outpatient Medications   Medication Sig Dispense Refill    ondansetron (ZOFRAN-ODT) 4 MG disintegrating tablet Take 1 tablet by mouth 3 times daily as needed for Nausea or Vomiting 12 tablet 0    pantoprazole (PROTONIX) 40 MG tablet Take 1 tablet by mouth daily      fluticasone (FLONASE) 50 MCG/ACT nasal spray 1 spray by Nasal route at bedtime      SKYRIZI  MG/ML SOAJ Every 3 months      cetirizine (ZYRTEC) 10 MG tablet Take 1 tablet by mouth daily      ALPRAZolam (XANAX) 0.25 MG tablet Take 1 tablet by mouth daily as needed. citalopram (CELEXA) 40 MG tablet Take 1 tablet by mouth daily      clobetasol (TEMOVATE) 0.05 % cream Apply to feet BID prn.      simvastatin (ZOCOR) 40 MG tablet Take 1 tablet by mouth daily       No current facility-administered medications for this visit.        Family History   Problem Relation Age of Onset    No Known Problems Brother     No Known Problems Father     Heart Failure Mother     High Cholesterol

## 2023-05-11 PROBLEM — L05.91 PILONIDAL CYST WITHOUT ABSCESS: Status: ACTIVE | Noted: 2023-05-11

## 2023-05-24 RX ORDER — IBUPROFEN 200 MG
800 TABLET ORAL EVERY 6 HOURS PRN
COMMUNITY

## 2023-06-09 ENCOUNTER — HOSPITAL ENCOUNTER (OUTPATIENT)
Age: 43
Setting detail: OUTPATIENT SURGERY
Discharge: HOME | End: 2023-06-09
Attending: SURGERY | Admitting: SURGERY
Payer: COMMERCIAL

## 2023-06-09 ENCOUNTER — ANESTHESIA EVENT (OUTPATIENT)
Dept: SURGERY | Age: 43
End: 2023-06-09
Payer: COMMERCIAL

## 2023-06-09 ENCOUNTER — ANESTHESIA (OUTPATIENT)
Dept: SURGERY | Age: 43
End: 2023-06-09
Payer: COMMERCIAL

## 2023-06-09 VITALS
DIASTOLIC BLOOD PRESSURE: 82 MMHG | BODY MASS INDEX: 30.61 KG/M2 | TEMPERATURE: 98 F | OXYGEN SATURATION: 94 % | HEART RATE: 50 BPM | WEIGHT: 195 LBS | HEIGHT: 67 IN | RESPIRATION RATE: 16 BRPM | SYSTOLIC BLOOD PRESSURE: 133 MMHG

## 2023-06-09 DIAGNOSIS — L05.91 PILONIDAL CYST WITHOUT ABSCESS: ICD-10-CM

## 2023-06-09 PROCEDURE — 2500000003 HC RX 250 WO HCPCS: Performed by: NURSE ANESTHETIST, CERTIFIED REGISTERED

## 2023-06-09 PROCEDURE — 2500000003 HC RX 250 WO HCPCS: Performed by: SURGERY

## 2023-06-09 PROCEDURE — 2500000003 HC RX 250 WO HCPCS: Performed by: ANESTHESIOLOGY

## 2023-06-09 PROCEDURE — 2580000003 HC RX 258: Performed by: ANESTHESIOLOGY

## 2023-06-09 PROCEDURE — 6360000002 HC RX W HCPCS: Performed by: SURGERY

## 2023-06-09 PROCEDURE — 6360000002 HC RX W HCPCS: Performed by: ANESTHESIOLOGY

## 2023-06-09 PROCEDURE — 6360000002 HC RX W HCPCS: Performed by: NURSE ANESTHETIST, CERTIFIED REGISTERED

## 2023-06-09 PROCEDURE — 88304 TISSUE EXAM BY PATHOLOGIST: CPT

## 2023-06-09 PROCEDURE — 2580000003 HC RX 258: Performed by: NURSE ANESTHETIST, CERTIFIED REGISTERED

## 2023-06-09 RX ORDER — SODIUM CHLORIDE, SODIUM LACTATE, POTASSIUM CHLORIDE, CALCIUM CHLORIDE 600; 310; 30; 20 MG/100ML; MG/100ML; MG/100ML; MG/100ML
INJECTION, SOLUTION INTRAVENOUS CONTINUOUS PRN
Status: DISCONTINUED | OUTPATIENT
Start: 2023-06-09 | End: 2023-06-09 | Stop reason: SDUPTHER

## 2023-06-09 RX ORDER — SODIUM CHLORIDE 9 MG/ML
INJECTION, SOLUTION INTRAVENOUS PRN
Status: DISCONTINUED | OUTPATIENT
Start: 2023-06-09 | End: 2023-06-09 | Stop reason: HOSPADM

## 2023-06-09 RX ORDER — MIDAZOLAM HYDROCHLORIDE 2 MG/2ML
2 INJECTION, SOLUTION INTRAMUSCULAR; INTRAVENOUS
Status: COMPLETED | OUTPATIENT
Start: 2023-06-09 | End: 2023-06-09

## 2023-06-09 RX ORDER — OXYCODONE HYDROCHLORIDE 5 MG/1
5 TABLET ORAL EVERY 6 HOURS PRN
Qty: 20 TABLET | Refills: 0 | Status: SHIPPED | OUTPATIENT
Start: 2023-06-09 | End: 2023-06-14

## 2023-06-09 RX ORDER — FENTANYL CITRATE 50 UG/ML
INJECTION, SOLUTION INTRAMUSCULAR; INTRAVENOUS PRN
Status: DISCONTINUED | OUTPATIENT
Start: 2023-06-09 | End: 2023-06-09 | Stop reason: SDUPTHER

## 2023-06-09 RX ORDER — SODIUM CHLORIDE, SODIUM LACTATE, POTASSIUM CHLORIDE, CALCIUM CHLORIDE 600; 310; 30; 20 MG/100ML; MG/100ML; MG/100ML; MG/100ML
INJECTION, SOLUTION INTRAVENOUS CONTINUOUS
Status: DISCONTINUED | OUTPATIENT
Start: 2023-06-09 | End: 2023-06-09 | Stop reason: HOSPADM

## 2023-06-09 RX ORDER — FENTANYL CITRATE 50 UG/ML
50 INJECTION, SOLUTION INTRAMUSCULAR; INTRAVENOUS PRN
Status: DISCONTINUED | OUTPATIENT
Start: 2023-06-09 | End: 2023-06-09 | Stop reason: HOSPADM

## 2023-06-09 RX ORDER — SODIUM CHLORIDE 0.9 % (FLUSH) 0.9 %
5-40 SYRINGE (ML) INJECTION PRN
Status: DISCONTINUED | OUTPATIENT
Start: 2023-06-09 | End: 2023-06-09 | Stop reason: HOSPADM

## 2023-06-09 RX ORDER — LIDOCAINE HYDROCHLORIDE 10 MG/ML
1 INJECTION, SOLUTION INFILTRATION; PERINEURAL
Status: COMPLETED | OUTPATIENT
Start: 2023-06-09 | End: 2023-06-09

## 2023-06-09 RX ORDER — PROPOFOL 10 MG/ML
INJECTION, EMULSION INTRAVENOUS PRN
Status: DISCONTINUED | OUTPATIENT
Start: 2023-06-09 | End: 2023-06-09 | Stop reason: SDUPTHER

## 2023-06-09 RX ORDER — GLYCOPYRROLATE 0.2 MG/ML
INJECTION INTRAMUSCULAR; INTRAVENOUS PRN
Status: DISCONTINUED | OUTPATIENT
Start: 2023-06-09 | End: 2023-06-09 | Stop reason: SDUPTHER

## 2023-06-09 RX ORDER — EPHEDRINE SULFATE/0.9% NACL/PF 50 MG/5 ML
SYRINGE (ML) INTRAVENOUS PRN
Status: DISCONTINUED | OUTPATIENT
Start: 2023-06-09 | End: 2023-06-09 | Stop reason: SDUPTHER

## 2023-06-09 RX ORDER — SODIUM CHLORIDE 0.9 % (FLUSH) 0.9 %
5-40 SYRINGE (ML) INJECTION EVERY 12 HOURS SCHEDULED
Status: DISCONTINUED | OUTPATIENT
Start: 2023-06-09 | End: 2023-06-09 | Stop reason: HOSPADM

## 2023-06-09 RX ORDER — NEOSTIGMINE METHYLSULFATE 1 MG/ML
INJECTION, SOLUTION INTRAVENOUS PRN
Status: DISCONTINUED | OUTPATIENT
Start: 2023-06-09 | End: 2023-06-09 | Stop reason: SDUPTHER

## 2023-06-09 RX ORDER — FENTANYL CITRATE 50 UG/ML
100 INJECTION, SOLUTION INTRAMUSCULAR; INTRAVENOUS PRN
Status: DISCONTINUED | OUTPATIENT
Start: 2023-06-09 | End: 2023-06-09 | Stop reason: HOSPADM

## 2023-06-09 RX ORDER — ROCURONIUM BROMIDE 10 MG/ML
INJECTION, SOLUTION INTRAVENOUS PRN
Status: DISCONTINUED | OUTPATIENT
Start: 2023-06-09 | End: 2023-06-09 | Stop reason: SDUPTHER

## 2023-06-09 RX ORDER — DEXAMETHASONE SODIUM PHOSPHATE 10 MG/ML
INJECTION INTRAMUSCULAR; INTRAVENOUS PRN
Status: DISCONTINUED | OUTPATIENT
Start: 2023-06-09 | End: 2023-06-09 | Stop reason: SDUPTHER

## 2023-06-09 RX ORDER — ONDANSETRON 2 MG/ML
4 INJECTION INTRAMUSCULAR; INTRAVENOUS
Status: DISCONTINUED | OUTPATIENT
Start: 2023-06-09 | End: 2023-06-09 | Stop reason: HOSPADM

## 2023-06-09 RX ORDER — ONDANSETRON 2 MG/ML
INJECTION INTRAMUSCULAR; INTRAVENOUS PRN
Status: DISCONTINUED | OUTPATIENT
Start: 2023-06-09 | End: 2023-06-09 | Stop reason: SDUPTHER

## 2023-06-09 RX ORDER — LIDOCAINE HYDROCHLORIDE 20 MG/ML
INJECTION, SOLUTION EPIDURAL; INFILTRATION; INTRACAUDAL; PERINEURAL PRN
Status: DISCONTINUED | OUTPATIENT
Start: 2023-06-09 | End: 2023-06-09 | Stop reason: SDUPTHER

## 2023-06-09 RX ORDER — OXYCODONE HYDROCHLORIDE 5 MG/1
5 TABLET ORAL
Status: DISCONTINUED | OUTPATIENT
Start: 2023-06-09 | End: 2023-06-09 | Stop reason: HOSPADM

## 2023-06-09 RX ORDER — BUPIVACAINE HYDROCHLORIDE 5 MG/ML
INJECTION, SOLUTION EPIDURAL; INTRACAUDAL PRN
Status: DISCONTINUED | OUTPATIENT
Start: 2023-06-09 | End: 2023-06-09 | Stop reason: ALTCHOICE

## 2023-06-09 RX ADMIN — FENTANYL CITRATE 100 MCG: 50 INJECTION, SOLUTION INTRAMUSCULAR; INTRAVENOUS at 11:41

## 2023-06-09 RX ADMIN — ONDANSETRON 4 MG: 2 INJECTION INTRAMUSCULAR; INTRAVENOUS at 11:57

## 2023-06-09 RX ADMIN — LIDOCAINE HYDROCHLORIDE 1 ML: 10 INJECTION, SOLUTION INFILTRATION; PERINEURAL at 11:15

## 2023-06-09 RX ADMIN — ROCURONIUM BROMIDE 40 MG: 50 INJECTION, SOLUTION INTRAVENOUS at 11:46

## 2023-06-09 RX ADMIN — Medication 10 MG: at 12:15

## 2023-06-09 RX ADMIN — SODIUM CHLORIDE, SODIUM LACTATE, POTASSIUM CHLORIDE, AND CALCIUM CHLORIDE: 600; 310; 30; 20 INJECTION, SOLUTION INTRAVENOUS at 11:40

## 2023-06-09 RX ADMIN — SODIUM CHLORIDE, POTASSIUM CHLORIDE, SODIUM LACTATE AND CALCIUM CHLORIDE: 600; 310; 30; 20 INJECTION, SOLUTION INTRAVENOUS at 11:15

## 2023-06-09 RX ADMIN — SUGAMMADEX 200 MG: 100 INJECTION, SOLUTION INTRAVENOUS at 12:32

## 2023-06-09 RX ADMIN — Medication 2 G: at 11:40

## 2023-06-09 RX ADMIN — DEXAMETHASONE SODIUM PHOSPHATE 10 MG: 10 INJECTION INTRAMUSCULAR; INTRAVENOUS at 11:57

## 2023-06-09 RX ADMIN — Medication 3 MG: at 12:23

## 2023-06-09 RX ADMIN — MIDAZOLAM 2 MG: 1 INJECTION INTRAMUSCULAR; INTRAVENOUS at 11:31

## 2023-06-09 RX ADMIN — PROPOFOL 200 MG: 10 INJECTION, EMULSION INTRAVENOUS at 11:46

## 2023-06-09 RX ADMIN — SODIUM CHLORIDE, SODIUM LACTATE, POTASSIUM CHLORIDE, AND CALCIUM CHLORIDE: 600; 310; 30; 20 INJECTION, SOLUTION INTRAVENOUS at 12:17

## 2023-06-09 RX ADMIN — LIDOCAINE HYDROCHLORIDE 60 MG: 20 INJECTION, SOLUTION EPIDURAL; INFILTRATION; INTRACAUDAL; PERINEURAL at 11:46

## 2023-06-09 RX ADMIN — GLYCOPYRROLATE 0.4 MG: 0.2 INJECTION INTRAMUSCULAR; INTRAVENOUS at 12:23

## 2023-06-09 ASSESSMENT — PAIN SCALES - GENERAL
PAINLEVEL_OUTOF10: 0

## 2023-06-09 ASSESSMENT — LIFESTYLE VARIABLES: SMOKING_STATUS: 1

## 2023-06-09 NOTE — BRIEF OP NOTE
Brief Postoperative Note      Patient: Marianna Madrid  YOB: 1980  MRN: [id number]    Date of Procedure: 6/9/2023    Pre-Op Diagnosis Codes:     * Pilonidal cyst without abscess [L05.91]    Post-Op Diagnosis: Same       Procedure(s):  PILONIDAL CYST EXCISION    Surgeon(s):  Judah Braun MD    Assistant:  * No surgical staff found *    Anesthesia: General    Estimated Blood Loss (mL): less than 50     Complications: None    Specimens:   ID Type Source Tests Collected by Time Destination   A : pilonidal cyst  Tissue Cyst SURGICAL PATHOLOGY Judah Braun MD 6/9/2023 1213        Implants:  * No implants in log *      Drains: * No LDAs found *    Findings: pilonidal cyst and pit along midline      Electronically signed by Judah Boateng MD on 6/9/2023 at 2:12 PM

## 2023-06-09 NOTE — OP NOTE
the surgical site and after verifying hemostasis some of the deep tissues were closed with 3-0 Vicryl sutures. The dermis was then closed with 3-0 Vicryl and the skin was closed with interrupted 2-0 nylon sutures. He tolerated the procedure well. Sterile dressing was applied to include bacitracin for followed by sterile 4 x 4's. He was then extubated without complications. Lap and instrument count at the completion of the procedure was correct x2. Estimated blood loss was approximately 10 mL. Patient condition at the completion of the procedure was stable and he was awoken from anesthesia and transported to recovery in stable condition.   Electronically signed by Devyn Almazan MD on 6/9/2023 at 2:12 PM

## 2023-06-09 NOTE — ANESTHESIA PRE PROCEDURE
(If Applicable):   Lab Results   Component Value Date/Time    COVID19 Not Detected 12/30/2020 01:58 PM           Anesthesia Evaluation  Patient summary reviewed and Nursing notes reviewed no history of anesthetic complications:   Airway: Mallampati: II  TM distance: >3 FB   Neck ROM: full  Mouth opening: > = 3 FB   Dental: normal exam         Pulmonary:normal exam    (+) sleep apnea: on CPAP,  current smoker    (-) asthma          Patient smoked on day of surgery. Cardiovascular:  Exercise tolerance: good (>4 METS),   (+) hyperlipidemia        Rhythm: regular  Rate: normal                    Neuro/Psych:   Negative Neuro/Psych ROS              GI/Hepatic/Renal:   (+) GERD: well controlled,           Endo/Other:        (-) diabetes mellitus, hypothyroidism, hyperthyroidism               Abdominal:             Vascular: negative vascular ROS. Other Findings:           Anesthesia Plan      general     ASA 2       Induction: intravenous. MIPS: Postoperative opioids intended and Prophylactic antiemetics administered. Anesthetic plan and risks discussed with patient. Use of blood products discussed with patient whom consented to blood products.                      Franky Giordano MD   6/9/2023

## 2023-06-09 NOTE — INTERVAL H&P NOTE
Update History & Physical    The patient's History and Physical of May 10, 2023 was reviewed with the patient and I examined the patient. There was no change. The surgical site was confirmed by the patient and me. Plan: The risks, benefits, expected outcome, and alternative to the recommended procedure have been discussed with the patient. Patient understands and wants to proceed with the procedure.      Electronically signed by Rubia Townsend MD on 6/9/2023 at 10:37 AM

## 2023-06-09 NOTE — DISCHARGE SUMMARY
0.25 MG tablet  Take 1 tablet by mouth daily as needed for Anxiety. citalopram (CELEXA) 40 MG tablet  Take 1 tablet by mouth daily    clobetasol (TEMOVATE) 0.05 % cream  Apply to feet BID prn.    simvastatin (ZOCOR) 40 MG tablet  Take 1 tablet by mouth daily          Current Discharge Medication List        Time Spent on Discharge:  minutes were spent in patient examination, evaluation, counseling as well as medication reconciliation, prescriptions for required medications, discharge plan, and follow up.     Electronically signed by Adolfo Bello MD on 6/9/23 at 12:36 PM EDT

## 2023-06-12 ASSESSMENT — ENCOUNTER SYMPTOMS
ANAL BLEEDING: 0
DIARRHEA: 0
CONSTIPATION: 0
VOMITING: 0
RESPIRATORY NEGATIVE: 1
GASTROINTESTINAL NEGATIVE: 1
NAUSEA: 0
BLOOD IN STOOL: 0

## 2023-06-20 ENCOUNTER — OFFICE VISIT (OUTPATIENT)
Dept: SURGERY | Age: 43
End: 2023-06-20

## 2023-06-20 DIAGNOSIS — Z09 POSTOPERATIVE EXAMINATION: Primary | ICD-10-CM

## 2023-06-20 PROCEDURE — 99024 POSTOP FOLLOW-UP VISIT: CPT | Performed by: NURSE PRACTITIONER

## 2023-06-20 RX ORDER — AMOXICILLIN AND CLAVULANATE POTASSIUM 875; 125 MG/1; MG/1
1 TABLET, FILM COATED ORAL 2 TIMES DAILY
Qty: 14 TABLET | Refills: 0 | Status: SHIPPED | OUTPATIENT
Start: 2023-06-20 | End: 2023-06-27

## 2023-06-20 NOTE — PATIENT INSTRUCTIONS
-Follow up 1 week with Dr. Toya Mohr as patient may need additional wound care.   -Maintain sutures at this time  -Apply wet to dry dressing twice per day as discussed to lower portion of incisional area  -Keep site clean and dry  -Avoid prolonged pressure to the area such as sitting for prolong periods.  -Avoid heavy lifting or strenuous activity > 20 pounds x 4 weeks post op   -Use unscented baby wipes after a bowel movement and avoid using toilet paper to site as this can get lodged in site.   -Do not get in tub or pool until site is completely healed.   -Augmentin 875 mg PO BID X 7 days

## 2023-06-28 ENCOUNTER — OFFICE VISIT (OUTPATIENT)
Dept: SURGERY | Age: 43
End: 2023-06-28

## 2023-06-28 DIAGNOSIS — L05.91 PILONIDAL CYST WITHOUT ABSCESS: Primary | ICD-10-CM

## 2023-06-28 PROCEDURE — 99024 POSTOP FOLLOW-UP VISIT: CPT | Performed by: SURGERY

## 2023-06-28 ASSESSMENT — ENCOUNTER SYMPTOMS
GASTROINTESTINAL NEGATIVE: 1
RESPIRATORY NEGATIVE: 1

## 2023-07-05 ENCOUNTER — OFFICE VISIT (OUTPATIENT)
Dept: SURGERY | Age: 43
End: 2023-07-05

## 2023-07-05 DIAGNOSIS — Z09 POSTOPERATIVE EXAMINATION: Primary | ICD-10-CM

## 2023-07-05 DIAGNOSIS — L05.91 PILONIDAL CYST WITHOUT ABSCESS: ICD-10-CM

## 2023-07-05 PROCEDURE — 99024 POSTOP FOLLOW-UP VISIT: CPT | Performed by: SURGERY

## 2023-07-05 ASSESSMENT — ENCOUNTER SYMPTOMS
GASTROINTESTINAL NEGATIVE: 1
RESPIRATORY NEGATIVE: 1

## 2023-07-05 NOTE — PROGRESS NOTES
changes. ASSESSMENT/PLAN:  Keily Brown was seen today for post-op check. Diagnoses and all orders for this visit:    Postoperative examination    Pilonidal cyst without abscess      Follow-up in 1 week.     Yan Ortiz MD

## 2023-07-12 ENCOUNTER — OFFICE VISIT (OUTPATIENT)
Dept: SURGERY | Age: 43
End: 2023-07-12

## 2023-07-12 DIAGNOSIS — Z09 POSTOPERATIVE EXAMINATION: Primary | ICD-10-CM

## 2023-07-12 PROCEDURE — 99024 POSTOP FOLLOW-UP VISIT: CPT | Performed by: SURGERY

## 2023-07-12 ASSESSMENT — ENCOUNTER SYMPTOMS
GASTROINTESTINAL NEGATIVE: 1
RESPIRATORY NEGATIVE: 1

## 2023-07-12 NOTE — PROGRESS NOTES
7/12/2023    Kimberlyn Chiang  MRN: [id number]      CHIEF COMPLAINT: doing fine      PRIMARY CARE PHYSICIAN: Denise Quintanilla MD      HISTORY:  6/9/2023 he underwent excision of a pilonidal cyst.  He was doing well postoperatively but then developed some drainage from the site and the lower aspect of his incision opened up. Since that time he has been doing local wound care cleansing the site then packing with moistened gauze and covering with a dry dressing. He tells me it seems to be healing. He has no other complaints today. REVIEW OF SYSTEMS:  Review of Systems   Constitutional: Negative. Respiratory: Negative. Cardiovascular: Negative. Gastrointestinal: Negative. Genitourinary: Negative. Past Medical History:   Diagnosis Date    Anxiety     managed with medication    Current smoker     1 pack/day    Dyslipidemia     managed with medication    GERD (gastroesophageal reflux disease)     managed with medication    Psoriasis     PUD (peptic ulcer disease)     \"teenager\"    Sleep apnea     c-pap; instructed to bring dos       Current Outpatient Medications   Medication Sig Dispense Refill    ibuprofen (ADVIL;MOTRIN) 200 MG tablet Take 4 tablets by mouth every 6 hours as needed for Pain      ondansetron (ZOFRAN-ODT) 4 MG disintegrating tablet Take 1 tablet by mouth 3 times daily as needed for Nausea or Vomiting 12 tablet 0    pantoprazole (PROTONIX) 40 MG tablet Take 1 tablet by mouth daily      SKYRIZI  MG/ML SOAJ Every 3 months      cetirizine (ZYRTEC) 10 MG tablet Take 1 tablet by mouth daily      ALPRAZolam (XANAX) 0.25 MG tablet Take 1 tablet by mouth daily as needed for Anxiety. citalopram (CELEXA) 40 MG tablet Take 1 tablet by mouth daily      clobetasol (TEMOVATE) 0.05 % cream Apply to feet BID prn.      simvastatin (ZOCOR) 40 MG tablet Take 1 tablet by mouth daily       No current facility-administered medications for this visit.        Family History

## 2023-07-26 ENCOUNTER — OFFICE VISIT (OUTPATIENT)
Dept: SURGERY | Age: 43
End: 2023-07-26

## 2023-07-26 DIAGNOSIS — Z09 POSTOPERATIVE EXAMINATION: Primary | ICD-10-CM

## 2023-07-26 PROCEDURE — 99024 POSTOP FOLLOW-UP VISIT: CPT | Performed by: SURGERY

## 2023-07-26 ASSESSMENT — ENCOUNTER SYMPTOMS
RESPIRATORY NEGATIVE: 1
GASTROINTESTINAL NEGATIVE: 1

## 2023-07-26 NOTE — PROGRESS NOTES
facility-administered medications for this visit. Family History   Problem Relation Age of Onset    No Known Problems Brother     No Known Problems Father     Heart Failure Mother     High Cholesterol Mother        Social History     Socioeconomic History    Marital status:      Spouse name: None    Number of children: None    Years of education: None    Highest education level: None   Tobacco Use    Smoking status: Every Day     Packs/day: 1.00     Types: Cigarettes     Start date: 2000    Smokeless tobacco: Never    Tobacco comments:     Started at age 24   Vaping Use    Vaping Use: Never used   Substance and Sexual Activity    Alcohol use: Yes     Comment: very occ    Drug use: Never         PHYSICAL EXAMINATION:  Physical Exam  Constitutional:       Appearance: Normal appearance. Cardiovascular:      Rate and Rhythm: Normal rate and regular rhythm. Pulmonary:      Effort: Pulmonary effort is normal.   Abdominal:      Palpations: Abdomen is soft. Skin:     Comments: Small opening at the inferior aspect of his incision following pilonidal surgery is healing well and granulating nicely. The area has almost completely granulated in and is very shallow. There is good reepithelialization at the edges. I thoroughly clean the site and covered with a dry dressing. Neurological:      Mental Status: He is alert. There were no vitals taken for this visit. IMPRESSION:  The area at the top of his gluteal cleft is healing well. It is very shallow and he no longer needs to pack the site. I would recommend he clean the area thoroughly with soap and water in the shower and then cover with a dry dressing. He will follow-up with me in 2 to 3 weeks for reevaluation and call me with any problems or changes. ASSESSMENT/PLAN:  [facility] was seen today for post-op check. Diagnoses and all orders for this visit:    Postoperative examination        Follow-up in 2 to 3 weeks.     Franca Elias

## 2023-08-21 ENCOUNTER — OFFICE VISIT (OUTPATIENT)
Dept: SURGERY | Age: 43
End: 2023-08-21

## 2023-08-21 DIAGNOSIS — Z09 POSTOPERATIVE EXAMINATION: Primary | ICD-10-CM

## 2023-08-21 PROCEDURE — 99024 POSTOP FOLLOW-UP VISIT: CPT | Performed by: SURGERY

## 2023-08-21 ASSESSMENT — ENCOUNTER SYMPTOMS
RESPIRATORY NEGATIVE: 1
GASTROINTESTINAL NEGATIVE: 1

## 2023-08-21 NOTE — PROGRESS NOTES
8/21/2023    Randal Navarrete  MRN: [id number]      CHIEF COMPLAINT: Minimal drainage      PRIMARY CARE PHYSICIAN: Nevaeh Montano MD      HISTORY:  6/9/2023 he underwent excision of a pilonidal cyst.  He was doing well postoperatively but then developed some drainage from the site and the lower aspect of his incision opened up. Since that time he has been doing local wound care. He has had minimal output from the site. He thinks that it is almost completely closed. No pain. REVIEW OF SYSTEMS:  Review of Systems   Constitutional: Negative. Respiratory: Negative. Cardiovascular: Negative. Gastrointestinal: Negative. Genitourinary: Negative. Past Medical History:   Diagnosis Date    Anxiety     managed with medication    Current smoker     1 pack/day    Dyslipidemia     managed with medication    GERD (gastroesophageal reflux disease)     managed with medication    Psoriasis     PUD (peptic ulcer disease)     \"teenager\"    Sleep apnea     c-pap; instructed to bring dos       Current Outpatient Medications   Medication Sig Dispense Refill    ibuprofen (ADVIL;MOTRIN) 200 MG tablet Take 4 tablets by mouth every 6 hours as needed for Pain      ondansetron (ZOFRAN-ODT) 4 MG disintegrating tablet Take 1 tablet by mouth 3 times daily as needed for Nausea or Vomiting 12 tablet 0    pantoprazole (PROTONIX) 40 MG tablet Take 1 tablet by mouth daily      SKYRIZI  MG/ML SOAJ Every 3 months      cetirizine (ZYRTEC) 10 MG tablet Take 1 tablet by mouth daily      ALPRAZolam (XANAX) 0.25 MG tablet Take 1 tablet by mouth daily as needed for Anxiety. citalopram (CELEXA) 40 MG tablet Take 1 tablet by mouth daily      clobetasol (TEMOVATE) 0.05 % cream Apply to feet BID prn.      simvastatin (ZOCOR) 40 MG tablet Take 1 tablet by mouth daily       No current facility-administered medications for this visit.        Family History   Problem Relation Age of Onset    No Known Problems

## (undated) DEVICE — STRIP,CLOSURE,WOUND,MEDI-STRIP,1/2X4: Brand: MEDLINE

## (undated) DEVICE — PRECISION THIN (9.0 X 0.38 X 31.0MM)

## (undated) DEVICE — TUBING 1895522 5PK STRAIGHTSHOT TO XPS: Brand: STRAIGHTSHOT®

## (undated) DEVICE — SUTURE PDS II SZ 0 L27IN ABSRB VLT L26MM CT-2 1/2 CIR Z334H

## (undated) DEVICE — STERILE HOOK LOCK LATEX FREE ELASTIC BANDAGE 6INX5YD: Brand: HOOK LOCK™

## (undated) DEVICE — ZIMMER® STERILE DISPOSABLE TOURNIQUET CUFF WITH PLC, DUAL PORT, SINGLE BLADDER, 30 IN. (76 CM)

## (undated) DEVICE — REM POLYHESIVE ADULT PATIENT RETURN ELECTRODE: Brand: VALLEYLAB

## (undated) DEVICE — BLADE 1882040 5PK INFERIOR TURB 2MM

## (undated) DEVICE — KNIFE SURG 10MM GRFT DISP FOR ACL RECON

## (undated) DEVICE — CANISTER, RIGID, 2000CC: Brand: MEDLINE INDUSTRIES, INC.

## (undated) DEVICE — SOLUTION IRRIG 1000ML 0.9% SOD CHL USP POUR PLAS BTL

## (undated) DEVICE — STOCKINETTE,IMPERVIOUS,12X48,STERILE: Brand: MEDLINE

## (undated) DEVICE — ACL DISPOSABLE PACK (6 PER PKG)

## (undated) DEVICE — CLEAR-TRAC SCR CANN 9MM LTX FREE: Brand: CLEAR-TRAC

## (undated) DEVICE — SUTURE ETHLN SZ 2-0 L18IN NONABSORBABLE BLK L19MM PS-2 PRIM 593H

## (undated) DEVICE — SOLUTION IV 1000ML LAC RINGERS PH 6.5 INJ USP VIAFLX PLAS

## (undated) DEVICE — SHEATH 1912000 5PK 4MM/0DEG STORZ XOMED: Brand: ENDO-SCRUB®

## (undated) DEVICE — NDL SPNE QNCKE 18GX3.5IN LF --

## (undated) DEVICE — OUTFLOW CASSETTE TUBING, DO NOT USE IF PACKAGE IS DAMAGED: Brand: CROSSFLOW

## (undated) DEVICE — [RESECTOR CUTTER, ARTHROSCOPIC SHAVER BLADE,  DO NOT RESTERILIZE,  DO NOT USE IF PACKAGE IS DAMAGED,  KEEP DRY,  KEEP AWAY FROM SUNLIGHT]: Brand: FORMULA

## (undated) DEVICE — STERILE POLYISOPRENE POWDER-FREE SURGICAL GLOVES: Brand: PROTEXIS

## (undated) DEVICE — SOLUTION IRRIG 3000ML 0.9% SOD CHL FLX CONT 0797208] ICU MEDICAL INC]

## (undated) DEVICE — 4-PORT MANIFOLD: Brand: NEPTUNE 2

## (undated) DEVICE — KIT PROCEDURE SURG HEAD AND NECK TOTE

## (undated) DEVICE — Device

## (undated) DEVICE — BUTTON SWITCH PENCIL BLADE ELECTRODE, HOLSTER: Brand: EDGE

## (undated) DEVICE — SUTURE CHROMIC GUT SZ 4-0 L27IN ABSRB BRN L17MM RB-1 1/2 U203H

## (undated) DEVICE — PREP SKN CHLRAPRP APL 26ML STR --

## (undated) DEVICE — WATERPROOF, BACTERIA PROOF DRESSING WITH ABSORBENT SEE THROUGH PAD: Brand: OPSITE POST-OP VISIBLE 15X10CM CTN 20

## (undated) DEVICE — BLADE 1884004HR TRICUT 5PK M4 4MM ROTATE: Brand: TRICUT

## (undated) DEVICE — SET IRRIG W 96IN TBNG 4 LN FLX BG

## (undated) DEVICE — GARMENT,MEDLINE,DVT,INT,CALF,MED, GEN2: Brand: MEDLINE

## (undated) DEVICE — STYLUS NSL BREATHING VIVAER ARC

## (undated) DEVICE — TUBING, SUCTION, 1/4" X 10', STRAIGHT: Brand: MEDLINE

## (undated) DEVICE — CARDINAL HEALTH FLEXIBLE LIGHT HANDLE COVER: Brand: CARDINAL HEALTH

## (undated) DEVICE — SUTURE STRATAFIX SPRL MCRYL + SZ 3-0 L18IN ABSRB UD PS-2 SXMP1B107

## (undated) DEVICE — INTENDED FOR TISSUE SEPARATION, AND OTHER PROCEDURES THAT REQUIRE A SHARP SURGICAL BLADE TO PUNCTURE OR CUT.: Brand: BARD-PARKER ® STAINLESS STEEL BLADES

## (undated) DEVICE — KNEE ARTHRO ACL-DR BAUMGARTEN: Brand: MEDLINE INDUSTRIES, INC.

## (undated) DEVICE — KIT,ANTI FOG,W/SPONGE & FLUID,SOFT PACK: Brand: MEDLINE

## (undated) DEVICE — GLOVE ORANGE PI 7 1/2   MSG9075

## (undated) DEVICE — SET IRRIG DST FLX M CONN

## (undated) DEVICE — T-DRAPE,EXTREMITY,STERILE: Brand: MEDLINE

## (undated) DEVICE — INFLOW CASSETTE TUBING, DO NOT USE IF PACKAGE IS DAMAGED: Brand: CROSSFLOW

## (undated) DEVICE — SUTURE NONABSORBABLE BRAIDED 5-0 30 IN ETHBND EXCEL D7809

## (undated) DEVICE — BANDAGE COBAN 6 IN WND 6INX5YD FOAM

## (undated) DEVICE — SHEATH 197230FLA 5PK ES2 STZ LF 4MM/45DG: Brand: ENDO-SCRUB®

## (undated) DEVICE — SUTURE MCRYL SZ 2-0 L27IN ABSRB UD CP-1 1 L36MM 1/2 CIR REV Y266H

## (undated) DEVICE — ADHESIVE LIQ H2O INSOLUBLE 3 CC LO RISK N STN MASTISOL

## (undated) DEVICE — WEREWOLF FLOW 90 COBLATION WAND: Brand: COBLATION

## (undated) DEVICE — CODMAN® SURGICAL PATTIES 1" X 3" (2.54CM X 7.62CM): Brand: CODMAN®

## (undated) DEVICE — BLADE SCALP SURG BARD-PARK 11 --

## (undated) DEVICE — SUTURE CHROMIC GUT SZ 3-0 L27IN ABSRB BRN L19MM FS-2 3/8 636H

## (undated) DEVICE — CONTAINER,SPECIMEN,O.R.STRL,4.5OZ: Brand: MEDLINE